# Patient Record
Sex: FEMALE | Race: WHITE | Employment: UNEMPLOYED | URBAN - METROPOLITAN AREA
[De-identification: names, ages, dates, MRNs, and addresses within clinical notes are randomized per-mention and may not be internally consistent; named-entity substitution may affect disease eponyms.]

---

## 2019-02-27 ENCOUNTER — APPOINTMENT (OUTPATIENT)
Dept: GENERAL RADIOLOGY | Age: 39
End: 2019-02-27
Attending: FAMILY MEDICINE
Payer: COMMERCIAL

## 2019-02-27 ENCOUNTER — HOSPITAL ENCOUNTER (OUTPATIENT)
Age: 39
Discharge: HOME | End: 2019-02-27
Attending: FAMILY MEDICINE
Payer: COMMERCIAL

## 2019-02-27 VITALS
DIASTOLIC BLOOD PRESSURE: 76 MMHG | OXYGEN SATURATION: 100 % | SYSTOLIC BLOOD PRESSURE: 123 MMHG | TEMPERATURE: 98 F | HEART RATE: 90 BPM | RESPIRATION RATE: 18 BRPM

## 2019-02-27 DIAGNOSIS — S92.901A CLOSED FRACTURE OF RIGHT FOOT, INITIAL ENCOUNTER: Primary | ICD-10-CM

## 2019-02-27 PROCEDURE — 99213 OFFICE O/P EST LOW 20 MIN: CPT

## 2019-02-27 PROCEDURE — 73610 X-RAY EXAM OF ANKLE: CPT | Performed by: FAMILY MEDICINE

## 2019-02-27 PROCEDURE — 73630 X-RAY EXAM OF FOOT: CPT | Performed by: FAMILY MEDICINE

## 2019-02-27 PROCEDURE — 28470 CLTX METATARSAL FX WO MNP EA: CPT

## 2019-02-27 NOTE — ED PROVIDER NOTES
[address] Seen [address]: [address]    History   Patient presents with:  Lower Extremity Injury (musculoskeletal)    Stated Complaint: INJURY TO RIGHT FOOT    HPI    Patient is here with the right foot pain after she rolled her motion. She exhibits edema (Mild swelling noted at the lateral malleolus and base of the right fifth metatarsal) and tenderness (Base of the right fifth metatarsal). She exhibits no deformity.    Neurological: She is alert and oriented to person, place, and podiatry    Disposition:  Discharge  2/27/2019  4:39 pm    Follow-up:  Mikala Duff DPM  [address]  [address] [facility] [address]  [phone]    Schedule an appointment as soon as possible for a visit

## 2019-10-30 ENCOUNTER — HOSPITAL ENCOUNTER (OUTPATIENT)
Dept: GENERAL RADIOLOGY | Age: 39
Discharge: HOME | End: 2019-10-30
Attending: FAMILY MEDICINE
Payer: COMMERCIAL

## 2019-10-30 DIAGNOSIS — R20.0 NUMBNESS: ICD-10-CM

## 2019-10-30 PROCEDURE — 73130 X-RAY EXAM OF HAND: CPT | Performed by: FAMILY MEDICINE

## 2021-04-13 NOTE — ED INITIAL ASSESSMENT (HPI)
Pt here with complaints of right ankle and foot pain, pt states she tripped on the side walk fell and twisted her ankle bad, both right ankle and foot are swollen and pts states she cannot put any wt on it 13-Apr-2021 08:55

## 2021-08-20 ENCOUNTER — HOSPITAL ENCOUNTER (OUTPATIENT)
Dept: MAMMOGRAPHY | Facility: HOSPITAL | Age: 41
Discharge: HOME | End: 2021-08-20
Attending: OBSTETRICS & GYNECOLOGY
Payer: COMMERCIAL

## 2021-08-20 ENCOUNTER — HOSPITAL ENCOUNTER (OUTPATIENT)
Dept: ULTRASOUND IMAGING | Facility: HOSPITAL | Age: 41
Discharge: HOME | End: 2021-08-20
Attending: OBSTETRICS & GYNECOLOGY
Payer: COMMERCIAL

## 2021-08-20 DIAGNOSIS — N60.02 SOLITARY CYST OF LEFT BREAST: ICD-10-CM

## 2021-08-20 PROCEDURE — 77066 DX MAMMO INCL CAD BI: CPT | Performed by: OBSTETRICS & GYNECOLOGY

## 2021-08-20 PROCEDURE — 77062 BREAST TOMOSYNTHESIS BI: CPT | Performed by: OBSTETRICS & GYNECOLOGY

## 2021-08-20 PROCEDURE — 76642 ULTRASOUND BREAST LIMITED: CPT | Performed by: OBSTETRICS & GYNECOLOGY

## 2021-11-06 ENCOUNTER — HOSPITAL ENCOUNTER (OUTPATIENT)
Age: 41
Discharge: HOME | End: 2021-11-06
Payer: COMMERCIAL

## 2021-11-06 VITALS
DIASTOLIC BLOOD PRESSURE: 97 MMHG | RESPIRATION RATE: 18 BRPM | HEART RATE: 90 BPM | TEMPERATURE: 98 F | OXYGEN SATURATION: 97 % | SYSTOLIC BLOOD PRESSURE: 125 MMHG

## 2021-11-06 DIAGNOSIS — M54.32 SCIATICA OF LEFT SIDE: Primary | ICD-10-CM

## 2021-11-06 PROCEDURE — 99213 OFFICE O/P EST LOW 20 MIN: CPT | Performed by: EMERGENCY MEDICINE

## 2021-11-06 RX ORDER — ESCITALOPRAM OXALATE 10 MG/1
10 TABLET ORAL DAILY
COMMUNITY

## 2021-11-06 RX ORDER — METHOCARBAMOL 500 MG/1
500 TABLET, FILM COATED ORAL 3 TIMES DAILY PRN
Qty: 14 TABLET | Refills: 0 | Status: SHIPPED | OUTPATIENT
Start: 2021-11-06 | End: 2021-12-14

## 2021-11-06 RX ORDER — VENLAFAXINE HYDROCHLORIDE 150 MG/1
CAPSULE, EXTENDED RELEASE ORAL
COMMUNITY
End: 2021-12-27

## 2021-11-06 RX ORDER — PREDNISONE 20 MG/1
60 TABLET ORAL ONCE
Status: COMPLETED | OUTPATIENT
Start: 2021-11-06 | End: 2021-11-06

## 2021-11-06 RX ORDER — PREDNISONE 20 MG/1
TABLET ORAL
Qty: 8 TABLET | Refills: 0 | Status: SHIPPED | OUTPATIENT
Start: 2021-11-07 | End: 2021-11-13

## 2021-11-06 RX ORDER — NORGESTIMATE AND ETHINYL ESTRADIOL 0.25-0.035
1 KIT ORAL DAILY
COMMUNITY

## 2021-11-06 NOTE — ED INITIAL ASSESSMENT (HPI)
Pt states on the 31st was lifting a table and felt something pull on left side of lower back into buttock. Pt states pain didn't really start until 1 to 2 days later pt states now unable to sit or get comfortable.  Pt states trying heat ice and massage naa

## 2021-11-06 NOTE — ED PROVIDER NOTES
Patient Seen in: [facility]      History   Patient presents with:  Back Pain    Stated Complaint: Pinched nerve    Subjective:   HPI  Ponce Macias is a 37year old female here for pinched nerve that started around [place].   Symptoms are pr Capillary Refill: Capillary refill takes less than 2 seconds. Findings: No erythema. Neurological:      General: No focal deficit present. Mental Status: She is alert and oriented to person, place, and time.    Psychiatric:         Attention a daily for 4 days. , Normal, Disp-8 tablet, R-0  NPI [id number]. Collaborating MD Betty Daniel. methocarbamol 500 MG Oral Tab  Take 1 tablet (500 mg total) by mouth 3 (three) times daily as needed., Normal, Disp-14 tablet, R-0  NPI [id number].  [employer]

## 2021-12-14 ENCOUNTER — OFFICE VISIT (OUTPATIENT)
Dept: ENDOCRINOLOGY CLINIC | Facility: CLINIC | Age: 41
End: 2021-12-14
Payer: COMMERCIAL

## 2021-12-14 VITALS — HEART RATE: 82 BPM | DIASTOLIC BLOOD PRESSURE: 86 MMHG | SYSTOLIC BLOOD PRESSURE: 143 MMHG

## 2021-12-14 DIAGNOSIS — R73.03 PRE-DIABETES: ICD-10-CM

## 2021-12-14 DIAGNOSIS — R19.7 DIARRHEA, UNSPECIFIED TYPE: Primary | ICD-10-CM

## 2021-12-14 PROCEDURE — 3077F SYST BP >= 140 MM HG: CPT | Performed by: INTERNAL MEDICINE

## 2021-12-14 PROCEDURE — 3079F DIAST BP 80-89 MM HG: CPT | Performed by: INTERNAL MEDICINE

## 2021-12-14 PROCEDURE — 99203 OFFICE O/P NEW LOW 30 MIN: CPT | Performed by: INTERNAL MEDICINE

## 2021-12-14 NOTE — H&P
New Patient Evaluation - History and Physical    CONSULT - Reason for Visit:  Pre diabetes   Requesting Physician: [facility]    CHIEF COMPLAINT:  Patient presents with:  Consult: Pt states has abnormal hormone imbalance issues since giv file.    ASSESSMENTS:     REVIEW OF SYSTEMS:  Constitutional: Negative for: weight change, fever, fatigue, cold/heat intolerance  Eyes: Negative for:  Visual changes, proptosis, blurring  ENT: Negative for:  dysphagia, neck swelling, dysphonia  Respiratory have anxiety, but states that she is doing well on lexapro and does not think that the diarrhea is related to anxiety  I reviewed that endocrine causes of diarrhea are very rare  Endocrine causes of chronic  diarrhea include Kopperl disease, carcinoid tumo

## 2021-12-27 ENCOUNTER — MED REC SCAN ONLY (OUTPATIENT)
Dept: FAMILY MEDICINE CLINIC | Facility: CLINIC | Age: 41
End: 2021-12-27

## 2021-12-27 ENCOUNTER — LAB ENCOUNTER (OUTPATIENT)
Dept: LAB | Age: 41
End: 2021-12-27
Attending: FAMILY MEDICINE
Payer: COMMERCIAL

## 2021-12-27 ENCOUNTER — OFFICE VISIT (OUTPATIENT)
Dept: FAMILY MEDICINE CLINIC | Facility: CLINIC | Age: 41
End: 2021-12-27
Payer: COMMERCIAL

## 2021-12-27 VITALS
WEIGHT: 158 LBS | OXYGEN SATURATION: 98 % | BODY MASS INDEX: 24.51 KG/M2 | HEART RATE: 83 BPM | DIASTOLIC BLOOD PRESSURE: 72 MMHG | SYSTOLIC BLOOD PRESSURE: 124 MMHG | HEIGHT: 67.5 IN

## 2021-12-27 DIAGNOSIS — J06.9 VIRAL URI: ICD-10-CM

## 2021-12-27 DIAGNOSIS — N94.6 DYSMENORRHEA: Primary | ICD-10-CM

## 2021-12-27 PROBLEM — F41.1 GENERALIZED ANXIETY DISORDER: Status: ACTIVE | Noted: 2020-08-16

## 2021-12-27 PROCEDURE — 99203 OFFICE O/P NEW LOW 30 MIN: CPT | Performed by: FAMILY MEDICINE

## 2021-12-27 PROCEDURE — 3078F DIAST BP <80 MM HG: CPT | Performed by: FAMILY MEDICINE

## 2021-12-27 PROCEDURE — 3074F SYST BP LT 130 MM HG: CPT | Performed by: FAMILY MEDICINE

## 2021-12-27 PROCEDURE — 3008F BODY MASS INDEX DOCD: CPT | Performed by: FAMILY MEDICINE

## 2021-12-27 RX ORDER — ASCORBIC ACID 500 MG
TABLET ORAL
COMMUNITY

## 2021-12-27 NOTE — PROGRESS NOTES
CC:  Patient presents with:  Establish Care: hormonal imbalance      HPI: 37year old female with a history of anxiety here to establish care. Had a physical and pap smear about 3 weeks ago with her gynecologist at [facility].    Had a hemoglobin A1C of Diagnosis Date   • Anxiety        Social History    Socioeconomic History      Marital status:       Spouse name: Not on file      Number of children: Not on file      Years of education: Not on file      Highest education level: Not on file    Oc and Plan: 37year old female here to establish care and discuss now chronic dysmenorrhea with concern for hormonal imbalance.      1. Dysmenorrhea    - Continuous OCP's help slightly but still having significant diarrhea and abdominal cramping on the last f

## 2021-12-31 ENCOUNTER — LAB ENCOUNTER (OUTPATIENT)
Dept: LAB | Age: 41
End: 2021-12-31
Attending: INTERNAL MEDICINE
Payer: COMMERCIAL

## 2021-12-31 DIAGNOSIS — R19.7 DIARRHEA, UNSPECIFIED TYPE: ICD-10-CM

## 2021-12-31 LAB
CORTIS SERPL-MCNC: 20.4 UG/DL
CREAT UR-SCNC: 0.71 G/24 HR (ref 0.6–1.8)
SPECIMEN VOL UR: 2000 ML

## 2021-12-31 PROCEDURE — 83497 ASSAY OF 5-HIAA: CPT

## 2021-12-31 PROCEDURE — 82308 ASSAY OF CALCITONIN: CPT

## 2021-12-31 PROCEDURE — 36415 COLL VENOUS BLD VENIPUNCTURE: CPT

## 2021-12-31 PROCEDURE — 82533 TOTAL CORTISOL: CPT

## 2021-12-31 PROCEDURE — 83835 ASSAY OF METANEPHRINES: CPT

## 2021-12-31 PROCEDURE — 82570 ASSAY OF URINE CREATININE: CPT

## 2021-12-31 PROCEDURE — 83516 IMMUNOASSAY NONANTIBODY: CPT

## 2022-01-04 LAB
CALCITONIN: <2 PG/ML
TTG IGG SER-ACNC: <0.6 U/ML (ref ?–7)

## 2022-01-05 LAB
5-HIAA URINE - PER 24H: 2 MG/D
5-HIAA URINE - PER VOLUME: 1 MG/L
5-HIAA URINE - RATIO TO CRT: 3 MG/GCR
CREATININE, URINE - PER 24H: 740 MG/D
CREATININE, URINE - PER VOLUME: 37 MG/DL
HOURS COLLECTED: 24 HR
METANEPHRINE: 0.11 NMOL/L
NORMETANEPHRINE: 0.39 NMOL/L
TOTAL VOLUME: 2000 ML

## 2022-02-25 ENCOUNTER — HOSPITAL ENCOUNTER (OUTPATIENT)
Dept: MAMMOGRAPHY | Facility: HOSPITAL | Age: 42
Discharge: HOME | End: 2022-02-25
Attending: OBSTETRICS & GYNECOLOGY
Payer: COMMERCIAL

## 2022-02-25 ENCOUNTER — HOSPITAL ENCOUNTER (OUTPATIENT)
Dept: ULTRASOUND IMAGING | Facility: HOSPITAL | Age: 42
Discharge: HOME | End: 2022-02-25
Attending: OBSTETRICS & GYNECOLOGY
Payer: COMMERCIAL

## 2022-02-25 DIAGNOSIS — N63.20 UNSPECIFIED LUMP IN THE LEFT BREAST, UNSPECIFIED QUADRANT: ICD-10-CM

## 2022-02-25 PROCEDURE — 77065 DX MAMMO INCL CAD UNI: CPT | Performed by: OBSTETRICS & GYNECOLOGY

## 2022-02-25 PROCEDURE — 76642 ULTRASOUND BREAST LIMITED: CPT | Performed by: OBSTETRICS & GYNECOLOGY

## 2022-02-25 PROCEDURE — 77061 BREAST TOMOSYNTHESIS UNI: CPT | Performed by: OBSTETRICS & GYNECOLOGY

## 2022-04-27 ENCOUNTER — TELEMEDICINE (OUTPATIENT)
Dept: INTEGRATIVE MEDICINE | Facility: CLINIC | Age: 42
End: 2022-04-27
Payer: COMMERCIAL

## 2022-04-27 DIAGNOSIS — N94.6 DYSMENORRHEA: ICD-10-CM

## 2022-04-27 DIAGNOSIS — R19.7 DIARRHEA, UNSPECIFIED TYPE: Primary | ICD-10-CM

## 2022-06-08 ENCOUNTER — LAB ENCOUNTER (OUTPATIENT)
Dept: LAB | Facility: REFERENCE LAB | Age: 42
End: 2022-06-08
Attending: FAMILY MEDICINE
Payer: COMMERCIAL

## 2022-06-08 DIAGNOSIS — R19.7 DIARRHEA, UNSPECIFIED TYPE: ICD-10-CM

## 2022-06-08 DIAGNOSIS — N94.6 DYSMENORRHEA: ICD-10-CM

## 2022-06-08 LAB
ALBUMIN SERPL-MCNC: 3.8 G/DL (ref 3.4–5)
ALBUMIN/GLOB SERPL: 1.1 {RATIO} (ref 1–2)
ALP LIVER SERPL-CCNC: 73 U/L
ALT SERPL-CCNC: 30 U/L
ANION GAP SERPL CALC-SCNC: 6 MMOL/L (ref 0–18)
AST SERPL-CCNC: 19 U/L (ref 15–37)
BASOPHILS # BLD AUTO: 0.07 X10(3) UL (ref 0–0.2)
BASOPHILS NFR BLD AUTO: 1 %
BILIRUB SERPL-MCNC: 0.2 MG/DL (ref 0.1–2)
BUN BLD-MCNC: 10 MG/DL (ref 7–18)
BUN/CREAT SERPL: 15.4 (ref 10–20)
CALCIUM BLD-MCNC: 9.5 MG/DL (ref 8.5–10.1)
CHLORIDE SERPL-SCNC: 108 MMOL/L (ref 98–112)
CO2 SERPL-SCNC: 25 MMOL/L (ref 21–32)
CREAT BLD-MCNC: 0.65 MG/DL
CRP SERPL HS-MCNC: 3.91 MG/L (ref ?–3)
DEPRECATED RDW RBC AUTO: 39.7 FL (ref 35.1–46.3)
DHEA-S SERPL-MCNC: 217.4 UG/DL
EOSINOPHIL # BLD AUTO: 0.19 X10(3) UL (ref 0–0.7)
EOSINOPHIL NFR BLD AUTO: 2.7 %
ERYTHROCYTE [DISTWIDTH] IN BLOOD BY AUTOMATED COUNT: 12.1 % (ref 11–15)
FASTING STATUS PATIENT QL REPORTED: NO
FSH SERPL-ACNC: 6.2 MIU/ML
GLOBULIN PLAS-MCNC: 3.4 G/DL (ref 2.8–4.4)
GLUCOSE BLD-MCNC: 104 MG/DL (ref 70–99)
HCT VFR BLD AUTO: 39.5 %
HGB BLD-MCNC: 12.8 G/DL
IMM GRANULOCYTES # BLD AUTO: 0.02 X10(3) UL (ref 0–1)
IMM GRANULOCYTES NFR BLD: 0.3 %
LYMPHOCYTES # BLD AUTO: 1.46 X10(3) UL (ref 1–4)
LYMPHOCYTES NFR BLD AUTO: 21.1 %
MCH RBC QN AUTO: 29 PG (ref 26–34)
MCHC RBC AUTO-ENTMCNC: 32.4 G/DL (ref 31–37)
MCV RBC AUTO: 89.6 FL
MONOCYTES # BLD AUTO: 0.52 X10(3) UL (ref 0.1–1)
MONOCYTES NFR BLD AUTO: 7.5 %
NEUTROPHILS # BLD AUTO: 4.66 X10 (3) UL (ref 1.5–7.7)
NEUTROPHILS # BLD AUTO: 4.66 X10(3) UL (ref 1.5–7.7)
NEUTROPHILS NFR BLD AUTO: 67.4 %
OSMOLALITY SERPL CALC.SUM OF ELEC: 287 MOSM/KG (ref 275–295)
PLATELET # BLD AUTO: 267 10(3)UL (ref 150–450)
POTASSIUM SERPL-SCNC: 3.9 MMOL/L (ref 3.5–5.1)
PROGEST SERPL-MCNC: 1.01 NG/ML
PROT SERPL-MCNC: 7.2 G/DL (ref 6.4–8.2)
RBC # BLD AUTO: 4.41 X10(6)UL
SODIUM SERPL-SCNC: 139 MMOL/L (ref 136–145)
WBC # BLD AUTO: 6.9 X10(3) UL (ref 4–11)

## 2022-06-08 PROCEDURE — 84144 ASSAY OF PROGESTERONE: CPT

## 2022-06-08 PROCEDURE — 82627 DEHYDROEPIANDROSTERONE: CPT

## 2022-06-08 PROCEDURE — 86141 C-REACTIVE PROTEIN HS: CPT

## 2022-06-08 PROCEDURE — 85025 COMPLETE CBC W/AUTO DIFF WBC: CPT

## 2022-06-08 PROCEDURE — 36415 COLL VENOUS BLD VENIPUNCTURE: CPT

## 2022-06-08 PROCEDURE — 80053 COMPREHEN METABOLIC PANEL: CPT

## 2022-06-08 PROCEDURE — 83001 ASSAY OF GONADOTROPIN (FSH): CPT

## 2022-06-08 PROCEDURE — 82671 ASSAY OF ESTROGENS: CPT

## 2022-06-09 ENCOUNTER — PATIENT MESSAGE (OUTPATIENT)
Dept: INTEGRATIVE MEDICINE | Facility: CLINIC | Age: 42
End: 2022-06-09

## 2022-06-13 NOTE — TELEPHONE ENCOUNTER
From: Brayden Loges  To: Uziel Fish DO  Sent: 6/9/2022 5:21 PM CDT  Subject: Lab results     Hi Dr. Luigi Patel,    I had one of my cyclical episodes and had lab work completed. I noticed my glucose level was slightly elevated and my hsCRP indicated high risk. Gulp. I exercise daily and eat a healthy diet. And I do not smoke. Thoughts? Concerns?      Thanks,  Wm

## 2022-06-22 LAB
ESTRADIOL BY TMS: 21.5 PG/ML
ESTROGENS TOTAL CALCULATION: 45.9 PG/ML
ESTRONE BY TMS: 24.4 PG/ML

## 2022-07-14 ENCOUNTER — TELEPHONE (OUTPATIENT)
Dept: INTEGRATIVE MEDICINE | Facility: CLINIC | Age: 42
End: 2022-07-14

## 2022-07-14 NOTE — TELEPHONE ENCOUNTER
Yanao Test testing received and placed in scan bin.      Copy placed in bin     Next Appointment:07/23/2022

## 2022-07-23 ENCOUNTER — TELEMEDICINE (OUTPATIENT)
Dept: INTEGRATIVE MEDICINE | Facility: CLINIC | Age: 42
End: 2022-07-23

## 2022-07-23 ENCOUNTER — PATIENT MESSAGE (OUTPATIENT)
Dept: OTOLARYNGOLOGY | Facility: CLINIC | Age: 42
End: 2022-07-23

## 2022-07-23 DIAGNOSIS — N94.6 DYSMENORRHEA: ICD-10-CM

## 2022-07-23 DIAGNOSIS — R19.7 DIARRHEA, UNSPECIFIED TYPE: Primary | ICD-10-CM

## 2022-07-23 PROCEDURE — 99214 OFFICE O/P EST MOD 30 MIN: CPT | Performed by: FAMILY MEDICINE

## 2022-07-23 RX ORDER — ESCITALOPRAM OXALATE 5 MG/1
TABLET ORAL
COMMUNITY
Start: 2022-06-26

## 2022-07-23 RX ORDER — DICYCLOMINE HYDROCHLORIDE 10 MG/1
CAPSULE ORAL
COMMUNITY
Start: 2022-07-06

## 2022-07-24 ENCOUNTER — PATIENT MESSAGE (OUTPATIENT)
Dept: FAMILY MEDICINE CLINIC | Facility: CLINIC | Age: 42
End: 2022-07-24

## 2022-07-24 NOTE — TELEPHONE ENCOUNTER
From: Leandro Carmona  To: Omega Russo DO  Sent: 7/24/2022 10:09 AM CDT  Subject: OBGYN recommendation     Hi Dr. Dino Brothers,    I received my [place] (hormone) test results, I have estrogen dominance and low progesterone which could be contributing to my cyclical symptoms. Per Dr. Lan Ludwig, I am going to start some supplements but I would also like an OBGYN referral-someone who specializes in endometriosis. Feel free to pass along any referrals in that area. Hope you are having a good summer.      Thanks,  Richard Thakkar

## 2022-08-22 ENCOUNTER — PATIENT MESSAGE (OUTPATIENT)
Dept: OBGYN CLINIC | Facility: CLINIC | Age: 42
End: 2022-08-22

## 2022-08-22 ENCOUNTER — TELEPHONE (OUTPATIENT)
Dept: OBGYN CLINIC | Facility: CLINIC | Age: 42
End: 2022-08-22

## 2022-08-22 ENCOUNTER — OFFICE VISIT (OUTPATIENT)
Dept: OBGYN CLINIC | Facility: CLINIC | Age: 42
End: 2022-08-22
Payer: COMMERCIAL

## 2022-08-22 VITALS
WEIGHT: 158 LBS | HEIGHT: 67.5 IN | SYSTOLIC BLOOD PRESSURE: 114 MMHG | BODY MASS INDEX: 24.51 KG/M2 | DIASTOLIC BLOOD PRESSURE: 60 MMHG

## 2022-08-22 DIAGNOSIS — Z12.31 BREAST CANCER SCREENING BY MAMMOGRAM: Primary | ICD-10-CM

## 2022-08-22 DIAGNOSIS — N80.9 ENDOMETRIOSIS: Primary | ICD-10-CM

## 2022-08-22 DIAGNOSIS — Z01.818 PREOP TESTING: ICD-10-CM

## 2022-08-22 PROCEDURE — 99204 OFFICE O/P NEW MOD 45 MIN: CPT | Performed by: OBSTETRICS & GYNECOLOGY

## 2022-08-22 PROCEDURE — 3074F SYST BP LT 130 MM HG: CPT | Performed by: OBSTETRICS & GYNECOLOGY

## 2022-08-22 PROCEDURE — 3078F DIAST BP <80 MM HG: CPT | Performed by: OBSTETRICS & GYNECOLOGY

## 2022-08-22 PROCEDURE — 3008F BODY MASS INDEX DOCD: CPT | Performed by: OBSTETRICS & GYNECOLOGY

## 2022-08-22 RX ORDER — POLYETHYLENE GLYCOL 1450
POWDER (GRAM) MISCELLANEOUS
Qty: 238 G | Refills: 0 | Status: SHIPPED | OUTPATIENT
Start: 2022-08-22

## 2022-08-22 RX ORDER — ESCITALOPRAM OXALATE 20 MG/1
TABLET ORAL
COMMUNITY
Start: 2022-08-02

## 2022-08-22 NOTE — TELEPHONE ENCOUNTER
----- Message from Randal June DO sent at 8/22/2022 11:07 AM CDT -----  Regarding: please sched laparoscopic endometriosis removal  Please schedule the following surgery:    Procedure: laparoscopy with endometriosis removal CPT 57343  Assist: Freda Zarco CSA  Date:  9/8/22                               Time Requested: 0730 or any avaiable time before 3pm  Dx: endometriosis  Pre-op appt: na  Admission type: outpatient  Department of discharge(SDS/Floor): [facility]  Expected length of stay: na  Procedure length time (please enter amount you are requesting): 1 hr  Recovery time (patients always ask): 1-2 weeks  Medical Clearance: (Y/N) no  Post- Op f/u appt time frame: 2 weeks    Thank  you!   ~RD

## 2022-08-23 ENCOUNTER — PATIENT MESSAGE (OUTPATIENT)
Dept: FAMILY MEDICINE CLINIC | Facility: CLINIC | Age: 42
End: 2022-08-23

## 2022-08-23 NOTE — TELEPHONE ENCOUNTER
From: Jaky Mascorro  To: Jaxson Lowery DO  Sent: 8/23/2022 8:07 AM CDT  Subject: Next steps     Hi Dr. Katya Segovia,    Just wanted to keep you in the loop. I met Dr. Cee Blank yesterday and I am scheduled for laparoscopy on 9/8 to explore if I have endometriosis. Thanks again for the referral.    Carlos Sacramento your son has a great school year!     Gianfranco Rushing

## 2022-09-05 ENCOUNTER — LAB ENCOUNTER (OUTPATIENT)
Dept: LAB | Facility: HOSPITAL | Age: 42
End: 2022-09-05
Attending: OBSTETRICS & GYNECOLOGY
Payer: COMMERCIAL

## 2022-09-05 DIAGNOSIS — Z01.818 PREOP TESTING: ICD-10-CM

## 2022-09-06 LAB — SARS-COV-2 RNA RESP QL NAA+PROBE: NOT DETECTED

## 2022-09-08 ENCOUNTER — ANESTHESIA (OUTPATIENT)
Dept: SURGERY | Facility: HOSPITAL | Age: 42
End: 2022-09-08
Payer: COMMERCIAL

## 2022-09-08 ENCOUNTER — ANESTHESIA EVENT (OUTPATIENT)
Dept: SURGERY | Facility: HOSPITAL | Age: 42
End: 2022-09-08
Payer: COMMERCIAL

## 2022-09-08 ENCOUNTER — HOSPITAL ENCOUNTER (OUTPATIENT)
Facility: HOSPITAL | Age: 42
Setting detail: HOSPITAL OUTPATIENT SURGERY
Discharge: HOME | End: 2022-09-08
Attending: OBSTETRICS & GYNECOLOGY | Admitting: OBSTETRICS & GYNECOLOGY
Payer: COMMERCIAL

## 2022-09-08 VITALS
WEIGHT: 160 LBS | OXYGEN SATURATION: 100 % | TEMPERATURE: 97 F | SYSTOLIC BLOOD PRESSURE: 106 MMHG | HEART RATE: 63 BPM | DIASTOLIC BLOOD PRESSURE: 49 MMHG | HEIGHT: 67.5 IN | BODY MASS INDEX: 24.82 KG/M2 | RESPIRATION RATE: 16 BRPM

## 2022-09-08 DIAGNOSIS — N80.9 ENDOMETRIOSIS: ICD-10-CM

## 2022-09-08 LAB
ANTIBODY SCREEN: NEGATIVE
B-HCG UR QL: NEGATIVE
RH BLOOD TYPE: POSITIVE
RH BLOOD TYPE: POSITIVE

## 2022-09-08 PROCEDURE — 99244 OFF/OP CNSLTJ NEW/EST MOD 40: CPT | Performed by: SURGERY

## 2022-09-08 PROCEDURE — 0DNN4ZZ RELEASE SIGMOID COLON, PERCUTANEOUS ENDOSCOPIC APPROACH: ICD-10-PCS | Performed by: SURGERY

## 2022-09-08 PROCEDURE — 0DNU4ZZ RELEASE OMENTUM, PERCUTANEOUS ENDOSCOPIC APPROACH: ICD-10-PCS | Performed by: OBSTETRICS & GYNECOLOGY

## 2022-09-08 PROCEDURE — 58660 LAPAROSCOPY LYSIS: CPT | Performed by: OBSTETRICS & GYNECOLOGY

## 2022-09-08 PROCEDURE — 0DNW4ZZ RELEASE PERITONEUM, PERCUTANEOUS ENDOSCOPIC APPROACH: ICD-10-PCS | Performed by: SURGERY

## 2022-09-08 PROCEDURE — 0UN64ZZ RELEASE LEFT FALLOPIAN TUBE, PERCUTANEOUS ENDOSCOPIC APPROACH: ICD-10-PCS | Performed by: OBSTETRICS & GYNECOLOGY

## 2022-09-08 PROCEDURE — 44180 LAP ENTEROLYSIS: CPT | Performed by: SURGERY

## 2022-09-08 RX ORDER — MIDAZOLAM HYDROCHLORIDE 1 MG/ML
INJECTION INTRAMUSCULAR; INTRAVENOUS AS NEEDED
Status: DISCONTINUED | OUTPATIENT
Start: 2022-09-08 | End: 2022-09-08 | Stop reason: SURG

## 2022-09-08 RX ORDER — HYDROMORPHONE HYDROCHLORIDE 1 MG/ML
0.4 INJECTION, SOLUTION INTRAMUSCULAR; INTRAVENOUS; SUBCUTANEOUS EVERY 5 MIN PRN
Status: DISCONTINUED | OUTPATIENT
Start: 2022-09-08 | End: 2022-09-08

## 2022-09-08 RX ORDER — MORPHINE SULFATE 4 MG/ML
4 INJECTION, SOLUTION INTRAMUSCULAR; INTRAVENOUS EVERY 10 MIN PRN
Status: DISCONTINUED | OUTPATIENT
Start: 2022-09-08 | End: 2022-09-08

## 2022-09-08 RX ORDER — IBUPROFEN 600 MG/1
600 TABLET ORAL EVERY 6 HOURS PRN
Qty: 60 TABLET | Refills: 0 | Status: SHIPPED | OUTPATIENT
Start: 2022-09-08

## 2022-09-08 RX ORDER — ACETAMINOPHEN 500 MG
1000 TABLET ORAL ONCE
Status: COMPLETED | OUTPATIENT
Start: 2022-09-08 | End: 2022-09-08

## 2022-09-08 RX ORDER — KETOROLAC TROMETHAMINE 30 MG/ML
INJECTION, SOLUTION INTRAMUSCULAR; INTRAVENOUS AS NEEDED
Status: DISCONTINUED | OUTPATIENT
Start: 2022-09-08 | End: 2022-09-08 | Stop reason: SURG

## 2022-09-08 RX ORDER — MORPHINE SULFATE 4 MG/ML
2 INJECTION, SOLUTION INTRAMUSCULAR; INTRAVENOUS EVERY 10 MIN PRN
Status: DISCONTINUED | OUTPATIENT
Start: 2022-09-08 | End: 2022-09-08

## 2022-09-08 RX ORDER — ONDANSETRON 2 MG/ML
INJECTION INTRAMUSCULAR; INTRAVENOUS AS NEEDED
Status: DISCONTINUED | OUTPATIENT
Start: 2022-09-08 | End: 2022-09-08 | Stop reason: SURG

## 2022-09-08 RX ORDER — ONDANSETRON 2 MG/ML
4 INJECTION INTRAMUSCULAR; INTRAVENOUS EVERY 6 HOURS PRN
Status: DISCONTINUED | OUTPATIENT
Start: 2022-09-08 | End: 2022-09-08

## 2022-09-08 RX ORDER — SODIUM CHLORIDE, SODIUM LACTATE, POTASSIUM CHLORIDE, CALCIUM CHLORIDE 600; 310; 30; 20 MG/100ML; MG/100ML; MG/100ML; MG/100ML
INJECTION, SOLUTION INTRAVENOUS CONTINUOUS
Status: DISCONTINUED | OUTPATIENT
Start: 2022-09-08 | End: 2022-09-08

## 2022-09-08 RX ORDER — BUPIVACAINE HYDROCHLORIDE 2.5 MG/ML
INJECTION, SOLUTION EPIDURAL; INFILTRATION; INTRACAUDAL AS NEEDED
Status: DISCONTINUED | OUTPATIENT
Start: 2022-09-08 | End: 2022-09-08 | Stop reason: HOSPADM

## 2022-09-08 RX ORDER — KETAMINE HYDROCHLORIDE 50 MG/ML
INJECTION, SOLUTION, CONCENTRATE INTRAMUSCULAR; INTRAVENOUS AS NEEDED
Status: DISCONTINUED | OUTPATIENT
Start: 2022-09-08 | End: 2022-09-08 | Stop reason: SURG

## 2022-09-08 RX ORDER — GABAPENTIN 300 MG/1
300 CAPSULE ORAL 3 TIMES DAILY
Qty: 10 CAPSULE | Refills: 0 | Status: SHIPPED | OUTPATIENT
Start: 2022-09-08 | End: 2022-09-11

## 2022-09-08 RX ORDER — LIDOCAINE HYDROCHLORIDE 10 MG/ML
INJECTION, SOLUTION EPIDURAL; INFILTRATION; INTRACAUDAL; PERINEURAL AS NEEDED
Status: DISCONTINUED | OUTPATIENT
Start: 2022-09-08 | End: 2022-09-08 | Stop reason: SURG

## 2022-09-08 RX ORDER — ROCURONIUM BROMIDE 10 MG/ML
INJECTION, SOLUTION INTRAVENOUS AS NEEDED
Status: DISCONTINUED | OUTPATIENT
Start: 2022-09-08 | End: 2022-09-08 | Stop reason: SURG

## 2022-09-08 RX ORDER — ONDANSETRON 2 MG/ML
4 INJECTION INTRAMUSCULAR; INTRAVENOUS EVERY 8 HOURS PRN
Status: CANCELLED | OUTPATIENT
Start: 2022-09-08

## 2022-09-08 RX ORDER — NEOSTIGMINE METHYLSULFATE 1 MG/ML
INJECTION, SOLUTION INTRAVENOUS AS NEEDED
Status: DISCONTINUED | OUTPATIENT
Start: 2022-09-08 | End: 2022-09-08 | Stop reason: SURG

## 2022-09-08 RX ORDER — HYDROMORPHONE HYDROCHLORIDE 1 MG/ML
0.6 INJECTION, SOLUTION INTRAMUSCULAR; INTRAVENOUS; SUBCUTANEOUS EVERY 5 MIN PRN
Status: DISCONTINUED | OUTPATIENT
Start: 2022-09-08 | End: 2022-09-08

## 2022-09-08 RX ORDER — PROCHLORPERAZINE EDISYLATE 5 MG/ML
5 INJECTION INTRAMUSCULAR; INTRAVENOUS EVERY 8 HOURS PRN
Status: DISCONTINUED | OUTPATIENT
Start: 2022-09-08 | End: 2022-09-08

## 2022-09-08 RX ORDER — MORPHINE SULFATE 10 MG/ML
6 INJECTION, SOLUTION INTRAMUSCULAR; INTRAVENOUS EVERY 10 MIN PRN
Status: DISCONTINUED | OUTPATIENT
Start: 2022-09-08 | End: 2022-09-08

## 2022-09-08 RX ORDER — IBUPROFEN 600 MG/1
600 TABLET ORAL EVERY 6 HOURS
Status: DISCONTINUED | OUTPATIENT
Start: 2022-09-08 | End: 2022-09-08

## 2022-09-08 RX ORDER — NALOXONE HYDROCHLORIDE 0.4 MG/ML
80 INJECTION, SOLUTION INTRAMUSCULAR; INTRAVENOUS; SUBCUTANEOUS AS NEEDED
Status: DISCONTINUED | OUTPATIENT
Start: 2022-09-08 | End: 2022-09-08

## 2022-09-08 RX ORDER — DEXAMETHASONE SODIUM PHOSPHATE 4 MG/ML
VIAL (ML) INJECTION AS NEEDED
Status: DISCONTINUED | OUTPATIENT
Start: 2022-09-08 | End: 2022-09-08 | Stop reason: SURG

## 2022-09-08 RX ORDER — ONDANSETRON 4 MG/1
4 TABLET, FILM COATED ORAL EVERY 8 HOURS PRN
Status: CANCELLED | OUTPATIENT
Start: 2022-09-08

## 2022-09-08 RX ORDER — ACETAMINOPHEN 325 MG/1
325 TABLET ORAL EVERY 6 HOURS PRN
Qty: 60 TABLET | Refills: 0 | Status: SHIPPED | OUTPATIENT
Start: 2022-09-08

## 2022-09-08 RX ORDER — HYDROMORPHONE HYDROCHLORIDE 1 MG/ML
0.2 INJECTION, SOLUTION INTRAMUSCULAR; INTRAVENOUS; SUBCUTANEOUS EVERY 5 MIN PRN
Status: DISCONTINUED | OUTPATIENT
Start: 2022-09-08 | End: 2022-09-08

## 2022-09-08 RX ORDER — GLYCOPYRROLATE 0.2 MG/ML
INJECTION, SOLUTION INTRAMUSCULAR; INTRAVENOUS AS NEEDED
Status: DISCONTINUED | OUTPATIENT
Start: 2022-09-08 | End: 2022-09-08 | Stop reason: SURG

## 2022-09-08 RX ADMIN — DEXAMETHASONE SODIUM PHOSPHATE 8 MG: 4 MG/ML VIAL (ML) INJECTION at 11:07:00

## 2022-09-08 RX ADMIN — KETAMINE HYDROCHLORIDE 25 MG: 50 INJECTION, SOLUTION, CONCENTRATE INTRAMUSCULAR; INTRAVENOUS at 11:17:00

## 2022-09-08 RX ADMIN — LIDOCAINE HYDROCHLORIDE 40 MG: 10 INJECTION, SOLUTION EPIDURAL; INFILTRATION; INTRACAUDAL; PERINEURAL at 11:00:00

## 2022-09-08 RX ADMIN — NEOSTIGMINE METHYLSULFATE 3.5 MG: 1 INJECTION, SOLUTION INTRAVENOUS at 11:57:00

## 2022-09-08 RX ADMIN — ROCURONIUM BROMIDE 30 MG: 10 INJECTION, SOLUTION INTRAVENOUS at 11:01:00

## 2022-09-08 RX ADMIN — GLYCOPYRROLATE 0.5 MG: 0.2 INJECTION, SOLUTION INTRAMUSCULAR; INTRAVENOUS at 11:57:00

## 2022-09-08 RX ADMIN — SODIUM CHLORIDE, SODIUM LACTATE, POTASSIUM CHLORIDE, CALCIUM CHLORIDE: 600; 310; 30; 20 INJECTION, SOLUTION INTRAVENOUS at 10:55:00

## 2022-09-08 RX ADMIN — ONDANSETRON 4 MG: 2 INJECTION INTRAMUSCULAR; INTRAVENOUS at 11:52:00

## 2022-09-08 RX ADMIN — KETOROLAC TROMETHAMINE 15 MG: 30 INJECTION, SOLUTION INTRAMUSCULAR; INTRAVENOUS at 11:57:00

## 2022-09-08 RX ADMIN — LIDOCAINE HYDROCHLORIDE 60 MG: 10 INJECTION, SOLUTION EPIDURAL; INFILTRATION; INTRACAUDAL; PERINEURAL at 11:01:00

## 2022-09-08 RX ADMIN — SODIUM CHLORIDE, SODIUM LACTATE, POTASSIUM CHLORIDE, CALCIUM CHLORIDE: 600; 310; 30; 20 INJECTION, SOLUTION INTRAVENOUS at 11:12:00

## 2022-09-08 RX ADMIN — MIDAZOLAM HYDROCHLORIDE 2 MG: 1 INJECTION INTRAMUSCULAR; INTRAVENOUS at 10:55:00

## 2022-09-08 NOTE — ANESTHESIA PROCEDURE NOTES
Airway  Date/Time: 9/8/2022 11:02 AM  Urgency: Elective      General Information and Staff    Patient location during procedure: OR  Resident/CRNA: Cassy Silverio CRNA  Performed: CRNA     Indications and Patient Condition  Indications for airway management: anesthesia  Sedation level: deep  Preoxygenated: yes  Patient position: sniffing  Mask difficulty assessment: 1 - vent by mask    Final Airway Details  Final airway type: endotracheal airway      Successful airway: ETT  Cuffed: yes   Successful intubation technique: direct laryngoscopy  Facilitating devices/methods: intubating stylet  Endotracheal tube insertion site: oral  Blade: Anibal  Blade size: #3  ETT size (mm): 6.5    Cormack-Lehane Classification: grade I - full view of glottis  Placement verified by: chest auscultation and capnometry   Cuff volume (mL): 6  Measured from: teeth  ETT to teeth (cm): 22  Number of attempts at approach: 1

## 2022-09-08 NOTE — OPERATIVE REPORT
OPERATIVE REPORT:     Patient: Joe Wilkins  MRN: [id number]  Date: 9/8/22    Pre op diagnosis:  Pelvic pain, endometriosis    Post op diagnosis:   Same as above with pelvic adhesions    Procedure:    Laparoscopic lysis of adhesions    Surgeon:  Dr Albania Nevarez DO     Consulting surgeon: Grant Madison MD    Assisting Surgeon: Nadya Panda CSA      Findings: Normal external genitalia, no lesions. Normal cervix, no lesions. Uterus adhered to anterior abdominal wall and bladder. Omental adhesion to anterior abdominal wall. Mesentery adhesion to posterior cul de sac and to left fallopian tube. Inflamed, tortuous vasculature throughout pelvis. Anesthesia: General     EBL: 10 mL    Fluids: 600 mL    Urine output: 100 mL    Procedure:   After informed consent was obtained, the patient was taken to the operating room and general anesthesia was initiated. She was placed in dorsal lithotomy position with arms tucked. She was prepped and draped in normal sterile fashion. A sterile speculum was placed in the vagina. The cervix was visualized. A single tooth tenaculum was used to grasp the anterior lip of the cervix. The [place] uterine manipulator was secured in place with the single tooth tenaculum. The pimentel urinary catheter was then properly secured. Attention was then turned towards the abdomen. After infiltration with 0.25% marcaine, a horizontal 5 mm incision was made within the superior portion of umbilicus. A Veress needle was then placed through the incision and advanced until two pop-like sensation were noted. A sterile syringed filled with normal saline was used to confirm placement of there Veress needle in the abdominal cavity. Once placement was confirmed, the gas tubing was connected and Co2 gas insufflation was initiated with opening pressure of 4 mmHg. The abdomen was insufflated to pressure of 15 mmHg.  The 5 mm laparoscopic camera was then placed into the 5 mm trocar and the abdomen was then entered under direct visualization. A second horizontal 5 mm incision was then made in the right lower quadrant and a 5 mm trocar was advanced under laparoscopic visualization. The enseal device was used to take down the omental adhesion. A third 5 mm trocar was placed in the left lower quadrant in similar fashion. The patient was placed in trendelenburg position. The fallopian tube adhesion was removed using the enseal device. Dr. Chava Borges was called to assist in bowel inspection and he removed the posterior cul de sac adhesion. No endometriosis deposits were visible for biopsy. All adhesion sited were reinspected and noted to be hemostatic. The pneumoperitoneum was released and the trocars were all removed. The incisions were closed with 4-0 monocryl and skin glue was placed. Attention was then directed towards the vagina. A sterile speculum was placed in the vagina. The cervix was visualized. The [place] uterine manipulator was removed from the cervical canal. The single tooth tenaculum was then removed. Good hemostasis was noted. All instruments were removed from the vagina. The pimentel urinary catheter was removed. All sponge, lap and instrument counts were correct x 2. Patient tolerated the procedure well and was taken to recovery room in stable condition.      Disposition: stable     Complications: None       Bisi Wong DO    [address] [place] [address] OBN

## 2022-09-08 NOTE — CONSULTS
[facility]    PATIENT'S NAME: Jordan Costa   ATTENDING PHYSICIAN: Kole Rea DO   CONSULTING PHYSICIAN: Keily Solano MD   PATIENT ACCOUNT#:   [de-identified]    LOCATION:  [facility]  MEDICAL RECORD #:   [id number]       YOB: 1980  ADMISSION DATE:       2022      CONSULT DATE:  2022    REPORT OF CONSULTATION    #####EDITING MAY BE REQUIRED#####    REASON FOR CONSULTATION:  Possible adhesiolysis, possible bowel resection, endometriosis. HISTORY OF PRESENT ILLNESS:  The patient is a very pleasant 56-year-old female who is interviewed with her  present. She is presenting for diagnostic laparoscopy and endometrial cyst removal by Dr. Kulwant Hurley. I am asked to possibly assist if there is bowel involvement. The patient has been having pelvic pain and workup was done by her gynecologist.    Derl Boy HISTORY:  Anxiety and depression, PUPP. PAST SURGICAL HISTORY:  , left cyst aspiration. The patient is G2, P2.    MEDICATIONS:  None. ALLERGIES:  None. SOCIAL HISTORY:  She does not drink, smoke, or take illicit drugs. Minimal caffeine intake. FAMILY HISTORY:  Noncontributory. PHYSICAL EXAMINATION:    GENERAL:  She is pleasant, appears comfortable and in no distress, alert and oriented x3. VITAL SIGNS:  Stable. NECK:  Supple without lymphadenopathy. Trachea midline. LUNGS:  Clear to auscultation. HEART:  Regular rate and rhythm. ABDOMEN:  Soft, nontender, nondistended. EXTREMITIES:  Without clubbing, cyanosis, or edema. LABORATORY DATA:  Her _______ is normal.  Hemoglobin 12.8. IMPRESSION:  Endometriosis. Discussed plan with the patient. She understands and agrees. I thank you for this consultation.     Dictated By Keily Solano MD  d: 2022 10:17:53  t: 2022 10:36:28  Job [id number]  GL/    cc: Kole Rea DO

## 2022-09-08 NOTE — PROGRESS NOTES
General surgery consult    Consult dictated    Endometriosis, possible lysis of adhesions possible bowel resection.   Explained to patient and her  who understand and agree

## 2022-09-09 NOTE — OPERATIVE REPORT
[facility]    PATIENT'S NAME: Neil William   ATTENDING PHYSICIAN: Blanca Aiken DO   OPERATING PHYSICIAN: Macrina Potter MD   PATIENT ACCOUNT#:   [de-identified]    LOCATION:  [facility] 10  MEDICAL RECORD #:   [id number]       YOB: 1980  ADMISSION DATE:       09/08/2022      OPERATION DATE:  09/08/2022    OPERATIVE REPORT      PREOPERATIVE DIAGNOSIS:  Intraoperative consultation for adhesiolysis. POSTOPERATIVE DIAGNOSIS:  Intraoperative consultation for adhesiolysis. PROCEDURE:  Laparoscopic adhesiolysis of colonic adhesions. INDICATIONS:  Patient is 37, is taken to surgery for evaluation for endometriosis. Please refer to Dr. Juan Pulido operative report. OPERATIVE TECHNIQUE:  Trocars were all placed. I came to assist with adhesiolysis. There was a dense band deep in the cul-de-sac of the pericolonic fat, which was lysed using EnSeal.  Additional adhesions of the sigmoid colon to the adnexa is performed as well. I completed the surgery. There was no bleeding identified and Dr. Declan Ureña took over. Please refer to her note.     Dictated By Macrina Potter MD  d: 09/08/2022 13:59:07  t: 09/08/2022 20:36:25  Job [id number]  /    cc: Blanca Aiken DO

## 2022-09-22 ENCOUNTER — OFFICE VISIT (OUTPATIENT)
Dept: OBGYN CLINIC | Facility: CLINIC | Age: 42
End: 2022-09-22

## 2022-09-22 VITALS
SYSTOLIC BLOOD PRESSURE: 118 MMHG | BODY MASS INDEX: 24.51 KG/M2 | WEIGHT: 158 LBS | HEIGHT: 67.5 IN | DIASTOLIC BLOOD PRESSURE: 64 MMHG

## 2022-09-22 DIAGNOSIS — Z09 POSTOPERATIVE EXAMINATION: Primary | ICD-10-CM

## 2022-09-22 DIAGNOSIS — N80.9 ENDOMETRIOSIS: ICD-10-CM

## 2022-09-22 PROCEDURE — 3078F DIAST BP <80 MM HG: CPT | Performed by: OBSTETRICS & GYNECOLOGY

## 2022-09-22 PROCEDURE — 99024 POSTOP FOLLOW-UP VISIT: CPT | Performed by: OBSTETRICS & GYNECOLOGY

## 2022-09-22 PROCEDURE — 3074F SYST BP LT 130 MM HG: CPT | Performed by: OBSTETRICS & GYNECOLOGY

## 2022-09-22 PROCEDURE — 3008F BODY MASS INDEX DOCD: CPT | Performed by: OBSTETRICS & GYNECOLOGY

## 2022-09-23 ENCOUNTER — PATIENT MESSAGE (OUTPATIENT)
Dept: FAMILY MEDICINE CLINIC | Facility: CLINIC | Age: 42
End: 2022-09-23

## 2022-09-23 DIAGNOSIS — R63.5 WEIGHT GAIN: Primary | ICD-10-CM

## 2022-09-23 DIAGNOSIS — Z00.00 ROUTINE GENERAL MEDICAL EXAMINATION AT A HEALTH CARE FACILITY: ICD-10-CM

## 2022-09-24 NOTE — TELEPHONE ENCOUNTER
From: Andrea Couch  To: Hannah Marie, DO  Sent: 9/23/2022 3:07 PM CDT  Subject: Request for lab order     Hi Dr. Lea Gallegos,    I have had a weight gain of 8-10 pounds since mid summer. I eat healthy and walk everyday. Can you please put in a lab order to check my A1C and T3 and T4 levels. My physical is scheduled for 11/1 but I want to get blood work done now.      Thanks,  Hayden Powell

## 2022-09-26 ENCOUNTER — PATIENT MESSAGE (OUTPATIENT)
Dept: FAMILY MEDICINE CLINIC | Facility: CLINIC | Age: 42
End: 2022-09-26

## 2022-09-26 ENCOUNTER — HOSPITAL ENCOUNTER (OUTPATIENT)
Dept: MAMMOGRAPHY | Facility: HOSPITAL | Age: 42
Discharge: HOME | End: 2022-09-26
Attending: OBSTETRICS & GYNECOLOGY

## 2022-09-26 DIAGNOSIS — R92.8 ABNORMAL MAMMOGRAM OF BOTH BREASTS: Primary | ICD-10-CM

## 2022-09-26 DIAGNOSIS — R92.2 INCONCLUSIVE MAMMOGRAM: ICD-10-CM

## 2022-09-26 DIAGNOSIS — R92.8 ABNORMAL MAMMOGRAM OF LEFT BREAST: Primary | ICD-10-CM

## 2022-09-26 DIAGNOSIS — Z12.31 BREAST CANCER SCREENING BY MAMMOGRAM: ICD-10-CM

## 2022-09-26 DIAGNOSIS — R92.8 ABNORMAL MAMMOGRAM OF BOTH BREASTS: ICD-10-CM

## 2022-09-26 PROCEDURE — 77062 BREAST TOMOSYNTHESIS BI: CPT | Performed by: OBSTETRICS & GYNECOLOGY

## 2022-09-26 PROCEDURE — 77066 DX MAMMO INCL CAD BI: CPT | Performed by: OBSTETRICS & GYNECOLOGY

## 2022-09-26 NOTE — TELEPHONE ENCOUNTER
From: Leandro Carmona  To: Omega Russo DO  Sent: 9/23/2022 3:07 PM CDT  Subject: Request for lab order     Hi Dr. Dino Brothers,    I have had a weight gain of 8-10 pounds since mid summer. I eat healthy and walk everyday. Can you please put in a lab order to check my A1C and T3 and T4 levels. My physical is scheduled for 11/1 but I want to get blood work done now.      Thanks,  Richard Thakkar

## 2022-10-13 ENCOUNTER — PATIENT MESSAGE (OUTPATIENT)
Dept: OBGYN CLINIC | Facility: CLINIC | Age: 42
End: 2022-10-13

## 2022-10-13 NOTE — TELEPHONE ENCOUNTER
From: Glenn Lance  To: Rosalia Oakes DO  Sent: 10/13/2022 9:49 AM CDT  Subject: Bloating     Hi Dr. Ericka Patterson,    My recovery from surgery went really well. However, I constantly feel bloated (even when I first wake up in the morning). Anything I can do to ease the bloating? I walk daily. And I am not ready to cut-out caffeine.  I need my coffee! :)    Thanks,  Law Turner

## 2022-10-21 ENCOUNTER — PATIENT MESSAGE (OUTPATIENT)
Dept: INTEGRATIVE MEDICINE | Facility: CLINIC | Age: 42
End: 2022-10-21

## 2022-11-01 ENCOUNTER — OFFICE VISIT (OUTPATIENT)
Dept: FAMILY MEDICINE CLINIC | Facility: CLINIC | Age: 42
End: 2022-11-01
Payer: COMMERCIAL

## 2022-11-01 ENCOUNTER — LAB ENCOUNTER (OUTPATIENT)
Dept: LAB | Age: 42
End: 2022-11-01
Attending: FAMILY MEDICINE
Payer: COMMERCIAL

## 2022-11-01 VITALS
HEART RATE: 76 BPM | OXYGEN SATURATION: 98 % | WEIGHT: 158 LBS | HEIGHT: 67.5 IN | SYSTOLIC BLOOD PRESSURE: 122 MMHG | DIASTOLIC BLOOD PRESSURE: 76 MMHG | BODY MASS INDEX: 24.51 KG/M2

## 2022-11-01 DIAGNOSIS — Z00.00 ENCOUNTER FOR ROUTINE ADULT HEALTH EXAMINATION WITHOUT ABNORMAL FINDINGS: Primary | ICD-10-CM

## 2022-11-01 DIAGNOSIS — Z00.00 ROUTINE GENERAL MEDICAL EXAMINATION AT A HEALTH CARE FACILITY: ICD-10-CM

## 2022-11-01 DIAGNOSIS — R63.5 WEIGHT GAIN: ICD-10-CM

## 2022-11-01 DIAGNOSIS — Z00.00 ENCOUNTER FOR ROUTINE ADULT HEALTH EXAMINATION WITHOUT ABNORMAL FINDINGS: ICD-10-CM

## 2022-11-01 DIAGNOSIS — F41.1 GENERALIZED ANXIETY DISORDER: ICD-10-CM

## 2022-11-01 LAB
ALBUMIN SERPL-MCNC: 3.8 G/DL (ref 3.4–5)
ALBUMIN/GLOB SERPL: 1.1 {RATIO} (ref 1–2)
ALP LIVER SERPL-CCNC: 89 U/L
ALT SERPL-CCNC: 27 U/L
ANION GAP SERPL CALC-SCNC: 7 MMOL/L (ref 0–18)
AST SERPL-CCNC: 15 U/L (ref 15–37)
BASOPHILS # BLD AUTO: 0.08 X10(3) UL (ref 0–0.2)
BASOPHILS NFR BLD AUTO: 1.1 %
BILIRUB SERPL-MCNC: 0.2 MG/DL (ref 0.1–2)
BUN BLD-MCNC: 13 MG/DL (ref 7–18)
BUN/CREAT SERPL: 18.8 (ref 10–20)
CALCIUM BLD-MCNC: 8.8 MG/DL (ref 8.5–10.1)
CHLORIDE SERPL-SCNC: 107 MMOL/L (ref 98–112)
CHOLEST SERPL-MCNC: 200 MG/DL (ref ?–200)
CO2 SERPL-SCNC: 26 MMOL/L (ref 21–32)
CREAT BLD-MCNC: 0.69 MG/DL
DEPRECATED RDW RBC AUTO: 38.2 FL (ref 35.1–46.3)
EOSINOPHIL # BLD AUTO: 0.22 X10(3) UL (ref 0–0.7)
EOSINOPHIL NFR BLD AUTO: 3 %
ERYTHROCYTE [DISTWIDTH] IN BLOOD BY AUTOMATED COUNT: 11.9 % (ref 11–15)
EST. AVERAGE GLUCOSE BLD GHB EST-MCNC: 111 MG/DL (ref 68–126)
FASTING PATIENT LIPID ANSWER: NO
FASTING STATUS PATIENT QL REPORTED: NO
GFR SERPLBLD BASED ON 1.73 SQ M-ARVRAT: 112 ML/MIN/1.73M2 (ref 60–?)
GLOBULIN PLAS-MCNC: 3.4 G/DL (ref 2.8–4.4)
GLUCOSE BLD-MCNC: 86 MG/DL (ref 70–99)
HBA1C MFR BLD: 5.5 % (ref ?–5.7)
HCT VFR BLD AUTO: 38 %
HCV AB SERPL QL IA: NONREACTIVE
HDLC SERPL-MCNC: 39 MG/DL (ref 40–59)
HGB BLD-MCNC: 12.6 G/DL
IMM GRANULOCYTES # BLD AUTO: 0.02 X10(3) UL (ref 0–1)
IMM GRANULOCYTES NFR BLD: 0.3 %
LDLC SERPL CALC-MCNC: 98 MG/DL (ref ?–100)
LYMPHOCYTES # BLD AUTO: 2.36 X10(3) UL (ref 1–4)
LYMPHOCYTES NFR BLD AUTO: 31.8 %
MCH RBC QN AUTO: 29.3 PG (ref 26–34)
MCHC RBC AUTO-ENTMCNC: 33.2 G/DL (ref 31–37)
MCV RBC AUTO: 88.4 FL
MONOCYTES # BLD AUTO: 0.61 X10(3) UL (ref 0.1–1)
MONOCYTES NFR BLD AUTO: 8.2 %
NEUTROPHILS # BLD AUTO: 4.13 X10 (3) UL (ref 1.5–7.7)
NEUTROPHILS # BLD AUTO: 4.13 X10(3) UL (ref 1.5–7.7)
NEUTROPHILS NFR BLD AUTO: 55.6 %
NONHDLC SERPL-MCNC: 161 MG/DL (ref ?–130)
OSMOLALITY SERPL CALC.SUM OF ELEC: 289 MOSM/KG (ref 275–295)
PLATELET # BLD AUTO: 278 10(3)UL (ref 150–450)
POTASSIUM SERPL-SCNC: 3.8 MMOL/L (ref 3.5–5.1)
PROT SERPL-MCNC: 7.2 G/DL (ref 6.4–8.2)
RBC # BLD AUTO: 4.3 X10(6)UL
SODIUM SERPL-SCNC: 140 MMOL/L (ref 136–145)
T3FREE SERPL-MCNC: 3.18 PG/ML (ref 2.4–4.2)
T4 FREE SERPL-MCNC: 0.8 NG/DL (ref 0.8–1.7)
TRIGL SERPL-MCNC: 376 MG/DL (ref 30–149)
TSI SER-ACNC: 2.69 MIU/ML (ref 0.36–3.74)
VLDLC SERPL CALC-MCNC: 63 MG/DL (ref 0–30)
WBC # BLD AUTO: 7.4 X10(3) UL (ref 4–11)

## 2022-11-01 PROCEDURE — 83036 HEMOGLOBIN GLYCOSYLATED A1C: CPT

## 2022-11-01 PROCEDURE — 84443 ASSAY THYROID STIM HORMONE: CPT

## 2022-11-01 PROCEDURE — 3074F SYST BP LT 130 MM HG: CPT | Performed by: FAMILY MEDICINE

## 2022-11-01 PROCEDURE — 3008F BODY MASS INDEX DOCD: CPT | Performed by: FAMILY MEDICINE

## 2022-11-01 PROCEDURE — 80053 COMPREHEN METABOLIC PANEL: CPT

## 2022-11-01 PROCEDURE — 85025 COMPLETE CBC W/AUTO DIFF WBC: CPT

## 2022-11-01 PROCEDURE — 3078F DIAST BP <80 MM HG: CPT | Performed by: FAMILY MEDICINE

## 2022-11-01 PROCEDURE — 36415 COLL VENOUS BLD VENIPUNCTURE: CPT

## 2022-11-01 PROCEDURE — 84439 ASSAY OF FREE THYROXINE: CPT

## 2022-11-01 PROCEDURE — 99396 PREV VISIT EST AGE 40-64: CPT | Performed by: FAMILY MEDICINE

## 2022-11-01 PROCEDURE — 84481 FREE ASSAY (FT-3): CPT

## 2022-11-01 PROCEDURE — 80061 LIPID PANEL: CPT

## 2022-11-01 PROCEDURE — 86803 HEPATITIS C AB TEST: CPT

## 2022-11-28 ENCOUNTER — TELEMEDICINE (OUTPATIENT)
Dept: INTEGRATIVE MEDICINE | Facility: CLINIC | Age: 42
End: 2022-11-28

## 2022-11-28 DIAGNOSIS — R19.7 DIARRHEA, UNSPECIFIED TYPE: Primary | ICD-10-CM

## 2022-11-28 DIAGNOSIS — N80.9 ENDOMETRIOSIS: ICD-10-CM

## 2022-11-30 ENCOUNTER — PATIENT MESSAGE (OUTPATIENT)
Dept: INTEGRATIVE MEDICINE | Facility: CLINIC | Age: 42
End: 2022-11-30

## 2022-11-30 DIAGNOSIS — R63.5 WEIGHT GAIN: ICD-10-CM

## 2022-11-30 DIAGNOSIS — R73.01 ELEVATED FASTING BLOOD SUGAR: Primary | ICD-10-CM

## 2022-12-09 ENCOUNTER — LAB ENCOUNTER (OUTPATIENT)
Dept: LAB | Facility: REFERENCE LAB | Age: 42
End: 2022-12-09
Attending: FAMILY MEDICINE
Payer: COMMERCIAL

## 2022-12-09 ENCOUNTER — PATIENT MESSAGE (OUTPATIENT)
Dept: FAMILY MEDICINE CLINIC | Facility: CLINIC | Age: 42
End: 2022-12-09

## 2022-12-09 DIAGNOSIS — R73.01 ELEVATED FASTING BLOOD SUGAR: ICD-10-CM

## 2022-12-09 DIAGNOSIS — R63.5 WEIGHT GAIN: ICD-10-CM

## 2022-12-09 LAB
INSULIN SERPL-ACNC: 16.2 MU/L (ref 3–25)
THYROGLOB SERPL-MCNC: <15 U/ML (ref ?–60)
THYROPEROXIDASE AB SERPL-ACNC: 53 U/ML (ref ?–60)

## 2022-12-09 PROCEDURE — 83525 ASSAY OF INSULIN: CPT

## 2022-12-09 PROCEDURE — 86800 THYROGLOBULIN ANTIBODY: CPT

## 2022-12-09 PROCEDURE — 86376 MICROSOMAL ANTIBODY EACH: CPT

## 2022-12-09 PROCEDURE — 82397 CHEMILUMINESCENT ASSAY: CPT | Performed by: FAMILY MEDICINE

## 2022-12-09 PROCEDURE — 36415 COLL VENOUS BLD VENIPUNCTURE: CPT | Performed by: FAMILY MEDICINE

## 2022-12-11 NOTE — TELEPHONE ENCOUNTER
From: Jeremiah Sheehan  To: Berdine Phoenix, DO  Sent: 12/9/2022 2:10 PM CST  Subject: Question regarding THYROID ANTITHYROGLOBULIN AB    Thanks Dr. Carlton Husain. Can you tell me what my anti-thyroglobulin level means? My level of less than 15 ML seems low compared to the standard range of 60 ML or less. I am trying to understand what this means! :)    Happy holidays to your family!      Take care,  Lakhwinder Moreira

## 2022-12-12 LAB — LEPTIN: 25.2 NG/ML

## 2022-12-15 ENCOUNTER — NURSE TRIAGE (OUTPATIENT)
Dept: FAMILY MEDICINE CLINIC | Facility: CLINIC | Age: 42
End: 2022-12-15

## 2022-12-19 ENCOUNTER — OFFICE VISIT (OUTPATIENT)
Dept: FAMILY MEDICINE CLINIC | Facility: CLINIC | Age: 42
End: 2022-12-19
Payer: COMMERCIAL

## 2022-12-19 VITALS — SYSTOLIC BLOOD PRESSURE: 118 MMHG | OXYGEN SATURATION: 98 % | HEART RATE: 80 BPM | DIASTOLIC BLOOD PRESSURE: 72 MMHG

## 2022-12-19 DIAGNOSIS — J02.9 PHARYNGITIS, UNSPECIFIED ETIOLOGY: ICD-10-CM

## 2022-12-19 DIAGNOSIS — J06.9 VIRAL URI WITH COUGH: Primary | ICD-10-CM

## 2022-12-19 PROCEDURE — 3078F DIAST BP <80 MM HG: CPT | Performed by: FAMILY MEDICINE

## 2022-12-19 PROCEDURE — 99213 OFFICE O/P EST LOW 20 MIN: CPT | Performed by: FAMILY MEDICINE

## 2022-12-19 PROCEDURE — 3074F SYST BP LT 130 MM HG: CPT | Performed by: FAMILY MEDICINE

## 2022-12-19 RX ORDER — METHYLPREDNISOLONE 4 MG/1
TABLET ORAL
Qty: 1 EACH | Refills: 0 | Status: SHIPPED | OUTPATIENT
Start: 2022-12-22

## 2022-12-26 ENCOUNTER — PATIENT MESSAGE (OUTPATIENT)
Dept: FAMILY MEDICINE CLINIC | Facility: CLINIC | Age: 42
End: 2022-12-26

## 2022-12-27 ENCOUNTER — TELEPHONE (OUTPATIENT)
Dept: FAMILY MEDICINE CLINIC | Facility: CLINIC | Age: 42
End: 2022-12-27

## 2022-12-27 NOTE — TELEPHONE ENCOUNTER
Patient left message with phone service on 12/26/2022 at 4:46pm. Stated she seen Doctor last week and her cough is not getting better. Would like to discuss what she can take alongside her RX that was given by Doctor. Please follow up with patient.

## 2022-12-27 NOTE — TELEPHONE ENCOUNTER
Shayna Sharif RN 12/27/2022 11:06 AM CST        ----- Message -----  From: Jeremiah Sheehan  Sent: 12/26/2022 4:28 PM CST  To: Em Rn Triage  Subject: Cough     Hi Dr. Lissette Moses,    I have 3 more days of my steroid package. My sore throat feels a lot better. Anything you can recommend OTC that I can take at night to suppress my cough? Thanks.      Happy Karlos Degroot

## 2022-12-28 ENCOUNTER — PATIENT MESSAGE (OUTPATIENT)
Dept: FAMILY MEDICINE CLINIC | Facility: CLINIC | Age: 42
End: 2022-12-28

## 2022-12-28 DIAGNOSIS — J02.9 SORE THROAT: Primary | ICD-10-CM

## 2022-12-29 NOTE — TELEPHONE ENCOUNTER
Routed to Dr. Ros Samuels for review. Dr. Norbert Dickens out of office. Please advise on next step? LOV 12/19/22. 2. Pharyngitis, unspecified etiology     -More recent symptoms likely 2/2 viral URI. Initial sore throat possibly viral pharyngitis. Recommend continue supportive care. Continue flonase, and add daily antihistamine for rhinorrhea & postnasal drip as it may be contributing to sore throat. -If sore throat persists despite resolution of other URI symptoms, may trial medrol dose pack later this week prior to her travels. Rx sent. -Return precautions given.

## 2022-12-29 NOTE — TELEPHONE ENCOUNTER
Pt called this morning that she did receive a [employer] message that she has been referred to see a ENT. Pt was informed of  message below. Pt stated that she will not be able to see a ENT until several weeks she will make the appt but in the meantime pt will like to know what she can try otc. Pt is currently in [place] with her parents. Pt will be back in early Jan. Pt has been having this sore throat/ irritation since Thanksgiving. Pt has tried lozenges,claritin,flonase,gargle with warm salt water and finished the prednisone. Pt is wanting to know if there is anything else she can try OTC that can help her in the meantime. Please advise. RN please sent pt a [employer] message with MD reply.

## 2022-12-29 NOTE — TELEPHONE ENCOUNTER
I have no other suggestions other than Tylenol or Motrin as she has tried everything I would typically recommend for a sore throat. Could visit an urgent care in [place] if symptoms worsen for possible strep testing.

## 2022-12-30 NOTE — TELEPHONE ENCOUNTER
pt calling again and Pt wants to know if she should switch to Claritin D or try Allegra D which would be best for her to take for her congestion? Has been taking plain Claritin.  Please advise

## 2022-12-30 NOTE — TELEPHONE ENCOUNTER
Flonase can not cause rebound congestion that only occurs with Afrin.  Any decongestant antihistamine if find to try, including Allegra, Claritin, or Zyrtec-D.

## 2023-01-13 ENCOUNTER — OFFICE VISIT (OUTPATIENT)
Dept: OTOLARYNGOLOGY | Facility: CLINIC | Age: 43
End: 2023-01-13

## 2023-01-13 DIAGNOSIS — J32.9 PURULENT POST-NASAL DISCHARGE: ICD-10-CM

## 2023-01-13 DIAGNOSIS — R05.2 SUBACUTE COUGH: ICD-10-CM

## 2023-01-13 DIAGNOSIS — H91.8X9 ASYMMETRICAL HEARING LOSS: Primary | ICD-10-CM

## 2023-01-13 PROCEDURE — 99203 OFFICE O/P NEW LOW 30 MIN: CPT | Performed by: SPECIALIST

## 2023-01-13 RX ORDER — DOXYCYCLINE HYCLATE 100 MG/1
100 CAPSULE ORAL 2 TIMES DAILY
Qty: 20 CAPSULE | Refills: 0 | Status: SHIPPED | OUTPATIENT
Start: 2023-01-13

## 2023-01-13 NOTE — PATIENT INSTRUCTIONS
I placed you on a trial of doxycycline for your postnasal drainage and cough. I ordered an audiogram to better evaluate your underlying hearing loss. I will of course call you with the results.

## 2023-01-17 ENCOUNTER — PATIENT MESSAGE (OUTPATIENT)
Dept: OBGYN CLINIC | Facility: CLINIC | Age: 43
End: 2023-01-17

## 2023-01-17 NOTE — TELEPHONE ENCOUNTER
From: Kassidy Narayan  To: Zulema Cline DO  Sent: 1/17/2023 2:09 PM CST  Subject: Appointment on 1/18     Hi Dr. Leif Berg,    I am scheduled to see you tomorrow! I feel good and have had no debilitating cyclical symptoms since my surgery. Let me know if you still want to meet!      Ana María Rivera

## 2023-01-17 NOTE — TELEPHONE ENCOUNTER
From: Rachael Tamez  Sent: 1/17/2023 2:23 PM CST  To: [facility] Clinical Staff  Subject: Appointment on 1/18     Thanks. My last Pap Smear was about 1-1.5 years ago. Is three years the timeframe for Pap Smear checkups?

## 2023-02-12 ENCOUNTER — PATIENT MESSAGE (OUTPATIENT)
Facility: CLINIC | Age: 43
End: 2023-02-12

## 2023-02-13 NOTE — TELEPHONE ENCOUNTER
From: Sergio Wright  To: Gagandeep Larsen DO  Sent: 2/12/2023 5:37 PM CST  Subject: Lingering cold     Hi Dr. Gerardo Myrick,    I am on Day 8 of a cold (I tested negative for COVID). I am taking acetaminophen when needed but still feel feverish on and off even towards the end of my cold.  Assuming this is normal?     Thanks,  Apple De La Cruz

## 2023-02-15 ENCOUNTER — OFFICE VISIT (OUTPATIENT)
Facility: CLINIC | Age: 43
End: 2023-02-15
Payer: COMMERCIAL

## 2023-02-15 ENCOUNTER — NURSE TRIAGE (OUTPATIENT)
Facility: CLINIC | Age: 43
End: 2023-02-15

## 2023-02-15 VITALS
WEIGHT: 158 LBS | HEIGHT: 67.5 IN | SYSTOLIC BLOOD PRESSURE: 130 MMHG | DIASTOLIC BLOOD PRESSURE: 78 MMHG | OXYGEN SATURATION: 98 % | HEART RATE: 106 BPM | BODY MASS INDEX: 24.51 KG/M2

## 2023-02-15 DIAGNOSIS — M94.0 COSTOCHONDRITIS, ACUTE: Primary | ICD-10-CM

## 2023-02-15 PROCEDURE — 99213 OFFICE O/P EST LOW 20 MIN: CPT | Performed by: FAMILY MEDICINE

## 2023-02-15 PROCEDURE — 3075F SYST BP GE 130 - 139MM HG: CPT | Performed by: FAMILY MEDICINE

## 2023-02-15 PROCEDURE — 3008F BODY MASS INDEX DOCD: CPT | Performed by: FAMILY MEDICINE

## 2023-02-15 PROCEDURE — 3078F DIAST BP <80 MM HG: CPT | Performed by: FAMILY MEDICINE

## 2023-02-15 RX ORDER — NAPROXEN 500 MG/1
500 TABLET ORAL 2 TIMES DAILY WITH MEALS
Qty: 28 TABLET | Refills: 0 | Status: SHIPPED | OUTPATIENT
Start: 2023-02-15 | End: 2023-02-19 | Stop reason: ALTCHOICE

## 2023-02-16 ENCOUNTER — TELEMEDICINE (OUTPATIENT)
Dept: INTEGRATIVE MEDICINE | Facility: CLINIC | Age: 43
End: 2023-02-16
Payer: COMMERCIAL

## 2023-02-16 DIAGNOSIS — R19.7 DIARRHEA, UNSPECIFIED TYPE: ICD-10-CM

## 2023-02-16 DIAGNOSIS — R73.01 ELEVATED FASTING BLOOD SUGAR: Primary | ICD-10-CM

## 2023-02-16 DIAGNOSIS — R63.5 WEIGHT GAIN: ICD-10-CM

## 2023-02-16 DIAGNOSIS — F41.1 GENERALIZED ANXIETY DISORDER: ICD-10-CM

## 2023-02-16 RX ORDER — TOPIRAMATE 25 MG/1
25 TABLET ORAL DAILY
Qty: 30 TABLET | Refills: 0 | Status: SHIPPED | OUTPATIENT
Start: 2023-02-16

## 2023-02-16 RX ORDER — BUPROPION HYDROCHLORIDE 150 MG/1
150 TABLET ORAL DAILY
COMMUNITY

## 2023-02-17 ENCOUNTER — TELEPHONE (OUTPATIENT)
Facility: CLINIC | Age: 43
End: 2023-02-17

## 2023-02-17 ENCOUNTER — HOSPITAL ENCOUNTER (OUTPATIENT)
Dept: GENERAL RADIOLOGY | Age: 43
Discharge: HOME | End: 2023-02-17
Attending: FAMILY MEDICINE
Payer: COMMERCIAL

## 2023-02-17 DIAGNOSIS — R07.81 RIB PAIN ON RIGHT SIDE: ICD-10-CM

## 2023-02-17 DIAGNOSIS — R07.81 RIB PAIN ON RIGHT SIDE: Primary | ICD-10-CM

## 2023-02-17 PROCEDURE — 71100 X-RAY EXAM RIBS UNI 2 VIEWS: CPT | Performed by: FAMILY MEDICINE

## 2023-02-17 RX ORDER — LIDOCAINE 50 MG/G
1 PATCH TOPICAL EVERY 24 HOURS
Qty: 30 EACH | Refills: 0 | Status: SHIPPED | OUTPATIENT
Start: 2023-02-17

## 2023-02-17 NOTE — TELEPHONE ENCOUNTER
Rib x-ray ordered and lidocaine patches sent to pharmacy. Will consider switching to Medrol pack instead of Naproxen if not helping but await x-ray results.

## 2023-02-17 NOTE — TELEPHONE ENCOUNTER
Dr. Yoo Morning: please advise on pain relief? Does she need xray? Patient C/o severe pain 8 of 10; has Constant pain on Rt side under rib cage; hurts to walk, and with any type of movement. Pain with coughing. Pain increasing as of yesterday. Very painful to lay down, and get out of bed. Has heating pad on; using naproxen as prescribed. No sob. Patient asking what else can she do to help with the pain; she has young children and unable to function.

## 2023-03-23 ENCOUNTER — NURSE TRIAGE (OUTPATIENT)
Facility: CLINIC | Age: 43
End: 2023-03-23

## 2023-06-03 ENCOUNTER — PATIENT MESSAGE (OUTPATIENT)
Facility: CLINIC | Age: 43
End: 2023-06-03

## 2023-06-04 NOTE — TELEPHONE ENCOUNTER
From: Yoselyn Sandoval  To: Dereck Covarrubias DO  Sent: 6/3/2023 3:51 PM CDT  Subject: Acid reflux     Hi Dr. Billie Larry had acid reflux for a couple of weeks now. Tums do not help. It happens a couple of times per day. Thoughts? Should I make an office visit?     Thanks,  Kristen Rodriguez

## 2023-06-07 ENCOUNTER — TELEMEDICINE (OUTPATIENT)
Dept: SURGERY | Facility: CLINIC | Age: 43
End: 2023-06-07
Payer: COMMERCIAL

## 2023-06-07 VITALS — WEIGHT: 165 LBS | BODY MASS INDEX: 25.59 KG/M2 | HEIGHT: 67.5 IN

## 2023-06-07 DIAGNOSIS — F41.1 GENERALIZED ANXIETY DISORDER: ICD-10-CM

## 2023-06-07 DIAGNOSIS — R63.5 WEIGHT GAIN: ICD-10-CM

## 2023-06-07 DIAGNOSIS — F32.A DEPRESSION, UNSPECIFIED DEPRESSION TYPE: ICD-10-CM

## 2023-06-07 DIAGNOSIS — E66.3 PATIENT OVERWEIGHT: Primary | ICD-10-CM

## 2023-06-07 DIAGNOSIS — E66.9 OBESITY (BMI 30-39.9): ICD-10-CM

## 2023-06-07 PROCEDURE — 99214 OFFICE O/P EST MOD 30 MIN: CPT | Performed by: NURSE PRACTITIONER

## 2023-06-07 RX ORDER — VENLAFAXINE 75 MG/1
75 TABLET ORAL DAILY
COMMUNITY

## 2023-06-07 NOTE — PATIENT INSTRUCTIONS
Cut down on dairy. Daily Calories:  120-174 lbs: 1200 calories/day. 175-219 lbs: 1500 calories/day. 220-249 lbs: 1800 calories/day. 250 lbs or more: 2,000 calories/day. Lose It. 60 grams protein/day. Carbs: 100 grams/day.     6: 1    I recommend a whole food, plant powered diet with low glycemic index:     Aim for 3 meals a day and 1-2 snacks as needed. Aim for a protein + produce at each meal time. Keep meals between 7 am and 7 pm.     Breakfast ideas:  1. Fruit and nuts/seeds. 2. Eggs scrambled with vegetables. 3. Oatmeal (stovetop), cinnamon, 2 tbsp flaxseed, berries, nuts. 4. Protein shake + fruit. (1 scoop with water/ice). [place] [address], [place], [place], and [place] are good brands. Snacks:  Raw vegetables and hummus, apples and peanut butter, nuts, seeds, fruit, pecans drizzled with organic honey. Use the Healthy Plate method for lunch and dinner:  1/2 right side of plate non-starchy vegetables. Bottom left 1/4 plate protein. Top left 1/4 starch as desired. Aim for a total of:  2 fruits a day (avocado, tomato, citrus/oranges, apples, berries)  1/2 cup or medium size    4 non-starchy vegetables (handful) (greens, peppers, onions, garlic, broccoli, cauliflower, etc.)    0-2 starches (oatmeal, sweet potato, carrots, brown rice, etc). 3 protein per day (fish, seafood, meat, or plants: salmon, nuts, seeds, shrimp, chicken, turkey, beans, lentils, chickpeas, etc).     2 healthy fats (avocado, avocado oil, olives, olive oil, salmon, nuts, seeds)

## 2023-06-10 NOTE — TELEPHONE ENCOUNTER
OFFICE STAFF=please assists.        Future Appointments   Date Time Provider Mary Naranjo   6/15/2023  6:30 PM Jose Enrique Serrano DO [facility]

## 2023-06-15 ENCOUNTER — TELEMEDICINE (OUTPATIENT)
Facility: CLINIC | Age: 43
End: 2023-06-15
Payer: COMMERCIAL

## 2023-06-15 DIAGNOSIS — R10.13 DYSPEPSIA: Primary | ICD-10-CM

## 2023-06-15 PROCEDURE — 99213 OFFICE O/P EST LOW 20 MIN: CPT | Performed by: FAMILY MEDICINE

## 2023-06-15 RX ORDER — FAMOTIDINE 20 MG/1
20 TABLET, FILM COATED ORAL 2 TIMES DAILY
Qty: 60 TABLET | Refills: 0 | Status: SHIPPED | OUTPATIENT
Start: 2023-06-15

## 2023-06-15 RX ORDER — HYDROXYZINE HYDROCHLORIDE 25 MG/1
TABLET, FILM COATED ORAL
COMMUNITY
Start: 2023-05-15

## 2023-08-09 ENCOUNTER — NURSE TRIAGE (OUTPATIENT)
Facility: CLINIC | Age: 43
End: 2023-08-09

## 2023-08-09 NOTE — TELEPHONE ENCOUNTER
Action Requested: Summary for Provider     []  Critical Lab, Recommendations Needed  [] Need Additional Advice  [x]   FYI    []   Need Orders  [] Need Medications Sent to Pharmacy  []  Other     SUMMARY: Patient states for past 1-2 weeks she feels lightheaded, denies room is spinning or tilting. Patient denies headache,nausea,numbness or weakness on one side of the body, speech is clear. Patient states she is a able to walk,drive, and is taking care of her children. She does not check her blood pressure. Denies pregnancy    Patient refusing immediate care, only wants to be seen in the office. Patient asking to be seen and only in [facility] location. Soonest appointment scheduled with SOPHY Brown. Patient advised to stay hydrated, stand up slowly, monitor blood pressure and  to call back if any new or worsening symptoms.          Reason for call: Dizziness  Onset: 1-2 weeks  Reason for Disposition   Patient wants to be seen    Protocols used: Delmy Rahman

## 2023-08-10 ENCOUNTER — PATIENT MESSAGE (OUTPATIENT)
Dept: OBGYN CLINIC | Facility: CLINIC | Age: 43
End: 2023-08-10

## 2023-08-10 ENCOUNTER — HOSPITAL ENCOUNTER (OUTPATIENT)
Dept: MAMMOGRAPHY | Facility: HOSPITAL | Age: 43
Discharge: HOME | End: 2023-08-10
Attending: OBSTETRICS & GYNECOLOGY
Payer: COMMERCIAL

## 2023-08-10 DIAGNOSIS — Z12.31 VISIT FOR SCREENING MAMMOGRAM: Primary | ICD-10-CM

## 2023-08-10 DIAGNOSIS — R92.8 ABNORMAL MAMMOGRAM OF LEFT BREAST: ICD-10-CM

## 2023-08-16 ENCOUNTER — LAB ENCOUNTER (OUTPATIENT)
Dept: LAB | Age: 43
End: 2023-08-16
Payer: COMMERCIAL

## 2023-08-16 ENCOUNTER — OFFICE VISIT (OUTPATIENT)
Dept: FAMILY MEDICINE CLINIC | Facility: CLINIC | Age: 43
End: 2023-08-16

## 2023-08-16 VITALS — WEIGHT: 160 LBS | BODY MASS INDEX: 25.11 KG/M2 | OXYGEN SATURATION: 97 % | HEIGHT: 67 IN | TEMPERATURE: 97 F

## 2023-08-16 DIAGNOSIS — R53.83 FATIGUE, UNSPECIFIED TYPE: ICD-10-CM

## 2023-08-16 DIAGNOSIS — E55.9 VITAMIN D DEFICIENCY: ICD-10-CM

## 2023-08-16 DIAGNOSIS — R42 DIZZINESS: Primary | ICD-10-CM

## 2023-08-16 DIAGNOSIS — R42 DIZZINESS: ICD-10-CM

## 2023-08-16 PROBLEM — R63.5 WEIGHT GAIN: Status: RESOLVED | Noted: 2023-06-07 | Resolved: 2023-08-16

## 2023-08-16 PROBLEM — E66.3 PATIENT OVERWEIGHT: Status: RESOLVED | Noted: 2023-06-07 | Resolved: 2023-08-16

## 2023-08-16 LAB
ALBUMIN SERPL-MCNC: 3.9 G/DL (ref 3.4–5)
ALBUMIN/GLOB SERPL: 1 {RATIO} (ref 1–2)
ALP LIVER SERPL-CCNC: 105 U/L
ALT SERPL-CCNC: 33 U/L
ANION GAP SERPL CALC-SCNC: 7 MMOL/L (ref 0–18)
AST SERPL-CCNC: 17 U/L (ref 15–37)
BASOPHILS # BLD AUTO: 0.07 X10(3) UL (ref 0–0.2)
BASOPHILS NFR BLD AUTO: 0.9 %
BILIRUB SERPL-MCNC: 0.2 MG/DL (ref 0.1–2)
BUN BLD-MCNC: 10 MG/DL (ref 7–18)
BUN/CREAT SERPL: 14.9 (ref 10–20)
CALCIUM BLD-MCNC: 9 MG/DL (ref 8.5–10.1)
CHLORIDE SERPL-SCNC: 105 MMOL/L (ref 98–112)
CO2 SERPL-SCNC: 27 MMOL/L (ref 21–32)
CREAT BLD-MCNC: 0.67 MG/DL
DEPRECATED RDW RBC AUTO: 38.7 FL (ref 35.1–46.3)
EGFRCR SERPLBLD CKD-EPI 2021: 113 ML/MIN/1.73M2 (ref 60–?)
EOSINOPHIL # BLD AUTO: 0.26 X10(3) UL (ref 0–0.7)
EOSINOPHIL NFR BLD AUTO: 3.3 %
ERYTHROCYTE [DISTWIDTH] IN BLOOD BY AUTOMATED COUNT: 12 % (ref 11–15)
EST. AVERAGE GLUCOSE BLD GHB EST-MCNC: 120 MG/DL (ref 68–126)
FASTING STATUS PATIENT QL REPORTED: NO
GLOBULIN PLAS-MCNC: 3.8 G/DL (ref 2.8–4.4)
GLUCOSE BLD-MCNC: 106 MG/DL (ref 70–99)
HBA1C MFR BLD: 5.8 % (ref ?–5.7)
HCT VFR BLD AUTO: 40 %
HGB BLD-MCNC: 13.1 G/DL
IMM GRANULOCYTES # BLD AUTO: 0.01 X10(3) UL (ref 0–1)
IMM GRANULOCYTES NFR BLD: 0.1 %
LYMPHOCYTES # BLD AUTO: 2.47 X10(3) UL (ref 1–4)
LYMPHOCYTES NFR BLD AUTO: 31.8 %
MCH RBC QN AUTO: 28.8 PG (ref 26–34)
MCHC RBC AUTO-ENTMCNC: 32.8 G/DL (ref 31–37)
MCV RBC AUTO: 87.9 FL
MONOCYTES # BLD AUTO: 0.62 X10(3) UL (ref 0.1–1)
MONOCYTES NFR BLD AUTO: 8 %
NEUTROPHILS # BLD AUTO: 4.34 X10 (3) UL (ref 1.5–7.7)
NEUTROPHILS # BLD AUTO: 4.34 X10(3) UL (ref 1.5–7.7)
NEUTROPHILS NFR BLD AUTO: 55.9 %
OSMOLALITY SERPL CALC.SUM OF ELEC: 287 MOSM/KG (ref 275–295)
PLATELET # BLD AUTO: 297 10(3)UL (ref 150–450)
POTASSIUM SERPL-SCNC: 4 MMOL/L (ref 3.5–5.1)
PROT SERPL-MCNC: 7.7 G/DL (ref 6.4–8.2)
RBC # BLD AUTO: 4.55 X10(6)UL
SODIUM SERPL-SCNC: 139 MMOL/L (ref 136–145)
TSI SER-ACNC: 1.76 MIU/ML (ref 0.36–3.74)
VIT D+METAB SERPL-MCNC: 36.4 NG/ML (ref 30–100)
WBC # BLD AUTO: 7.8 X10(3) UL (ref 4–11)

## 2023-08-16 PROCEDURE — 85025 COMPLETE CBC W/AUTO DIFF WBC: CPT

## 2023-08-16 PROCEDURE — 82306 VITAMIN D 25 HYDROXY: CPT

## 2023-08-16 PROCEDURE — 84443 ASSAY THYROID STIM HORMONE: CPT

## 2023-08-16 PROCEDURE — 99213 OFFICE O/P EST LOW 20 MIN: CPT

## 2023-08-16 PROCEDURE — 36415 COLL VENOUS BLD VENIPUNCTURE: CPT

## 2023-08-16 PROCEDURE — 83036 HEMOGLOBIN GLYCOSYLATED A1C: CPT

## 2023-08-16 PROCEDURE — 80053 COMPREHEN METABOLIC PANEL: CPT

## 2023-08-16 PROCEDURE — 3008F BODY MASS INDEX DOCD: CPT

## 2023-08-16 RX ORDER — VENLAFAXINE HYDROCHLORIDE 75 MG/1
75 CAPSULE, EXTENDED RELEASE ORAL DAILY
COMMUNITY
Start: 2023-08-10

## 2023-08-16 RX ORDER — VENLAFAXINE HYDROCHLORIDE 37.5 MG/1
37.5 CAPSULE, EXTENDED RELEASE ORAL DAILY
COMMUNITY
Start: 2023-08-09

## 2023-08-17 ENCOUNTER — PATIENT MESSAGE (OUTPATIENT)
Dept: FAMILY MEDICINE CLINIC | Facility: CLINIC | Age: 43
End: 2023-08-17

## 2023-08-17 NOTE — TELEPHONE ENCOUNTER
From: Carrie Sevilla  To: SOPHY Elmore  Sent: 8/17/2023 9:44 AM CDT  Subject: Test follow up     Nice to meet you, Stephania Stauffer! I noticed my A1C was slightly elevated (I did not fast). Should I be concerned?      Thanks,  Poonam Mccormick

## 2023-09-27 ENCOUNTER — OFFICE VISIT (OUTPATIENT)
Dept: OBGYN CLINIC | Facility: CLINIC | Age: 43
End: 2023-09-27
Payer: COMMERCIAL

## 2023-09-27 VITALS — DIASTOLIC BLOOD PRESSURE: 82 MMHG | SYSTOLIC BLOOD PRESSURE: 128 MMHG | HEIGHT: 67.5 IN | BODY MASS INDEX: 25 KG/M2

## 2023-09-27 DIAGNOSIS — K46.9 HERNIA: Primary | ICD-10-CM

## 2023-09-27 DIAGNOSIS — Z12.4 PAP SMEAR FOR CERVICAL CANCER SCREENING: ICD-10-CM

## 2023-09-27 DIAGNOSIS — Z01.419 VISIT FOR GYNECOLOGIC EXAMINATION: Primary | ICD-10-CM

## 2023-09-27 DIAGNOSIS — Z12.4 SCREENING FOR CERVICAL CANCER: ICD-10-CM

## 2023-09-27 PROCEDURE — 3079F DIAST BP 80-89 MM HG: CPT | Performed by: OBSTETRICS & GYNECOLOGY

## 2023-09-27 PROCEDURE — 3074F SYST BP LT 130 MM HG: CPT | Performed by: OBSTETRICS & GYNECOLOGY

## 2023-09-27 PROCEDURE — 99396 PREV VISIT EST AGE 40-64: CPT | Performed by: OBSTETRICS & GYNECOLOGY

## 2023-09-27 PROCEDURE — 3008F BODY MASS INDEX DOCD: CPT | Performed by: OBSTETRICS & GYNECOLOGY

## 2023-09-27 PROCEDURE — 87624 HPV HI-RISK TYP POOLED RSLT: CPT | Performed by: OBSTETRICS & GYNECOLOGY

## 2023-09-27 PROCEDURE — 88175 CYTOPATH C/V AUTO FLUID REDO: CPT | Performed by: OBSTETRICS & GYNECOLOGY

## 2023-09-27 NOTE — PROGRESS NOTES
ANNUAL GYN EXAM  [facility]    CHIEF COMPLAINT:  Patient presents with: Annual     HISTORY OF PRESENT ILLNESS:   Richard Jimenez is a 40year old female [id number]  who presents for annual well woman visit. She is feeling well. Annual exam    Patient's last menstrual period was 2023 (exact date). Monthly / regular    Regular BM    + sex, + condoms  No bleeding with sex  No new partners    Exercise: 3-4 times per week  Diet: fine, glucose was a little elevated with recent checkup so is watching sugar intake  Mood: fine    Has diastasis recti and is not getting better despite ab work. Is painful sometimes. PAST MEDICAL HISTORY:   Past Medical History:   Diagnosis Date    Anxiety     Depression     PUPP (pruritic urticarial papules and plaques of pregnancy)         PAST SURGICAL HISTORY:   Past Surgical History:   Procedure Laterality Date          2018    Cyst aspiration left      Done at [facility] in [place]    Hc  section level i        2018, 2013    Duncan teeth removed          PAST OB HISTORY:  OB History    Para Term  AB Living   2 2 2     2   SAB IAB Ectopic Multiple Live Births           1      # Outcome Date GA Lbr Solo/2nd Weight Sex Delivery Anes PTL Lv   2 Term 18 39w0d  8 lb 11 oz (3.94 kg) M CS-LTranv Spinal  KRIS   1 Term 13   8 lb 6 oz (3.799 kg) M Vag-Forceps          CURRENT MEDICATIONS:      Current Outpatient Medications:     tretinoin 0.025 % External Cream, , Disp: , Rfl:     venlafaxine ER 37.5 MG Oral Capsule SR 24 Hr, Take 1 capsule (37.5 mg total) by mouth daily. , Disp: , Rfl:     venlafaxine ER 75 MG Oral Capsule SR 24 Hr, Take 1 capsule (75 mg total) by mouth daily. TAKE 1 CAPSULE BY MOUTH DAILY WITH FOOD.  TAKE WITH THE 37.5MG CAPSULE FOR TOTAL DAILY DOSE .5MG, Disp: , Rfl:     Multiple Vitamin (MULTI-DAY) Oral Tab, Take by mouth every morning., Disp: , Rfl:     ALLERGIES:    Erythromycin            RASH Comment:Cerner Allergy Text Annotation: erythromycin    SOCIAL HISTORY:  Social History    Socioeconomic History      Marital status:     Tobacco Use      Smoking status: Never      Smokeless tobacco: Never    Vaping Use      Vaping Use: Never used    Substance and Sexual Activity      Alcohol use: Yes        Comment: minimal      Drug use: Never      Sexual activity: Yes        Partners: Male        Birth control/protection: Condom    Other Topics      Concerns:        Caffeine Concern: No        Exercise: Yes        Seat Belt: No        Special Diet: No        Stress Concern: No        Weight Concern: Yes        Blood Transfusions: No      FAMILY HISTORY:  Family History   Problem Relation Age of Onset    Thyroid disease Mother         Hyperthyroidism     No Known Problems Father     No Known Problems Sister     Thyroid disease Maternal Grandfather     No Known Problems Son      ASSESSMENTS:  REVIEW OF SYSTEMS:  CONSTITUTIONAL:  negative for fevers, chills and sweats    EYES:  negative for  blurred vision and visual disturbance  RESPIRATORY:  negative for  cough and shortness of breath  CARDIOVASCULAR:  negative for  chest pain, palpitations  GASTROINTESTINAL:  No constipation/diarrhea, no pain  GENITOURINARY:  See History of Present Illness  INTEGUMENT/BREAST: Breast: no masses, no nipple discharge  ENDOCRINE:  negative for acne, constipation, diarrhea, cold intolerance, heat intolerance, fatigue, hair loss, weight gain and weight loss  MUSCULOSKELETAL:  negative for joint pain  NEUROLOGICAL:  negative for dizziness/lightheadedness and headaches  BEHAVIOR/PSYCH:  Negative for depressed mood, anhedonia and anxiety    PHYSICAL EXAM  Patient's last menstrual period was 09/02/2023 (exact date).     09/27/23  1234   BP: 128/82   Height: 67.5\"       CONSTITUTIONAL: Awake, alert, cooperative, no apparent distress, and appears stated age   NECK: Supple, symmetrical, trachea midline, no adenopathy, thyroid symmetric, not enlarged and no tenderness  LUNGS: no excess work of breathing  ABDOMEN: Soft, non-distended, non-tender, no masses palpated; bulge superior to umbilicus, with a crunch. Nonpainful to palpation. CHEST/BREASTS: Breasts symmetrical, skin without lesion(s), no nipple retraction or dimpling, no nipple discharge, no masses palpated, no axillary or supraclavicular adenopathy  GENITAL/URINARY:    External Genitalia:  General appearance; normal, Hair distribution; normal, Lesions absent   Urethral Meatus:  Lesions absent, Prolapse absent  Bladder:  Tenderness absent, Cystocele absent  Vagina:  Discharge absent, Lesions absent, Pelvic support normal  Cervix:  Lesions absent, Discharge absent, Tenderness absent  Uterus:  Size normal, Masses absent, Tenderness absent  Adnexa: Masses absent, Tenderness absent  Anus/Perineum:  Lesions absent    MUSCULOSKELETAL: There is no redness, warmth, or swelling of the joints. Tone is normal.  NEUROLOGIC: Patient is awake, alert and oriented to name, place and time. Casual gait is normal.  SKIN: no bruising or bleeding and no rashes  PSYCHIATRIC: Behavior:  Appropriate  Mood:  appropriate  ASSESSMENT AND PLAN:  1. Visit for gynecologic examination  - CBE and pelvic exam with pap /hpv today. Self breast awareness discussed. - the bulge on abdomen is suspicious for ventral hernia, less likely diastasis. As it is painful sometimes, recommend further work-up, consider CT abdomen / consult with gen surg. 2. Screening for cervical cancer  - ThinPrep PAP Smear; Future  - Hpv Dna  High Risk , Thin Prep Collect;  Future    follow up PRN or 1 year  Alanna Chavez DO

## 2023-09-28 LAB — HPV I/H RISK 1 DNA SPEC QL NAA+PROBE: NEGATIVE

## 2023-10-04 ENCOUNTER — HOSPITAL ENCOUNTER (OUTPATIENT)
Dept: MAMMOGRAPHY | Age: 43
Discharge: HOME | End: 2023-10-04
Attending: OBSTETRICS & GYNECOLOGY
Payer: COMMERCIAL

## 2023-10-04 DIAGNOSIS — Z12.31 VISIT FOR SCREENING MAMMOGRAM: ICD-10-CM

## 2023-10-04 PROCEDURE — 77063 BREAST TOMOSYNTHESIS BI: CPT | Performed by: OBSTETRICS & GYNECOLOGY

## 2023-10-04 PROCEDURE — 77067 SCR MAMMO BI INCL CAD: CPT | Performed by: OBSTETRICS & GYNECOLOGY

## 2023-10-17 ENCOUNTER — HOSPITAL ENCOUNTER (OUTPATIENT)
Dept: MAMMOGRAPHY | Facility: HOSPITAL | Age: 43
Discharge: HOME | End: 2023-10-17
Attending: OBSTETRICS & GYNECOLOGY
Payer: COMMERCIAL

## 2023-10-17 ENCOUNTER — HOSPITAL ENCOUNTER (OUTPATIENT)
Dept: ULTRASOUND IMAGING | Facility: HOSPITAL | Age: 43
Discharge: HOME | End: 2023-10-17
Attending: OBSTETRICS & GYNECOLOGY
Payer: COMMERCIAL

## 2023-10-17 DIAGNOSIS — R92.8 ABNORMAL MAMMOGRAM: ICD-10-CM

## 2023-10-17 PROCEDURE — 76642 ULTRASOUND BREAST LIMITED: CPT | Performed by: OBSTETRICS & GYNECOLOGY

## 2023-10-17 PROCEDURE — 77061 BREAST TOMOSYNTHESIS UNI: CPT | Performed by: OBSTETRICS & GYNECOLOGY

## 2023-10-17 PROCEDURE — 77065 DX MAMMO INCL CAD UNI: CPT | Performed by: OBSTETRICS & GYNECOLOGY

## 2023-10-18 NOTE — DISCHARGE INSTRUCTIONS
The Doctor (Radiologist) who performed your procedure was: DR Ifeanyi Motta Dr an ice pack over the biopsy site on top of your bra or on top of the ACE wrap (never apply ice directly over skin) for 10-15 minutes of every hour until bedtime for your comfort and to decrease bleeding. Keep your sports bra or the ACE wrap (stereotactic and MRI biopsy) in place for 24 hours after your biopsy. This compression decreases bleeding and breast movement for your comfort. Wear a supportive bra for the next couple of days for comfort (sports bra for sleep). Continue to wear, preferably, a sports bra or good supportive bra for 1 week and take off only to shower. No baths or showers during the first 24 hours after biopsy. After this time you may take a shower. It's okay if the strips get wet but do not soak them. NO saunas, hot tubs or swimming until steri-strips fall off (approx. 5 days). This prevents infection and allows time for them to completely close and heal.  DO NOT remove the steri-strips. They will fall off in 5 days. If any type of irritation (redness, itching or blisters) develops in the area around the steri-strips, remove them gently. If the steri-strips do not fall off after 5 days, gently remove them. Keep the area clean and dry. It is normal to have mild discomfort and bruising at the biopsy site. You may take Tylenol as needed for discomfort, as long as you have no allergies to Tylenol. Do not take aspirin, motrin, ibuprofen or any medication containing NSAID (non-steroidal anti-inflammatory drug) product for 48 hours. DO NOT participate in strenuous activity (aerobics, heavy lifting, housework, gardening, etc.) 48 hours after your biopsy to prevent bleeding. You will receive results in 2-3 business days. If you are having an MRI breast biopsy or an Ultrasound guided breast biopsy, you will be billed for the biopsy and unilateral mammogram separately.   If you have any questions about the procedure or your results, please contact the Breast Care Coordinator Nurse at [phone]. Notify your ordering physician or primary physician for increased bleeding, pain or fever over 100. Or contact a Radiology Nurse at [phone] between 8am-4pm (after 4pm, your call will be directed to the [facility]).

## 2023-10-19 ENCOUNTER — HOSPITAL ENCOUNTER (OUTPATIENT)
Dept: MAMMOGRAPHY | Facility: HOSPITAL | Age: 43
Discharge: HOME | End: 2023-10-19
Attending: OBSTETRICS & GYNECOLOGY
Payer: COMMERCIAL

## 2023-10-19 ENCOUNTER — HOSPITAL ENCOUNTER (OUTPATIENT)
Dept: ULTRASOUND IMAGING | Facility: HOSPITAL | Age: 43
Discharge: HOME | End: 2023-10-19
Attending: OBSTETRICS & GYNECOLOGY
Payer: COMMERCIAL

## 2023-10-19 DIAGNOSIS — N63.10 BREAST MASS, RIGHT: ICD-10-CM

## 2023-10-19 PROCEDURE — 88342 IMHCHEM/IMCYTCHM 1ST ANTB: CPT | Performed by: OBSTETRICS & GYNECOLOGY

## 2023-10-19 PROCEDURE — 19083 BX BREAST 1ST LESION US IMAG: CPT | Performed by: OBSTETRICS & GYNECOLOGY

## 2023-10-19 PROCEDURE — 77065 DX MAMMO INCL CAD UNI: CPT | Performed by: OBSTETRICS & GYNECOLOGY

## 2023-10-19 PROCEDURE — 88305 TISSUE EXAM BY PATHOLOGIST: CPT | Performed by: OBSTETRICS & GYNECOLOGY

## 2023-10-19 NOTE — IMAGING NOTE
1100 Pt arrived to room #4 .  scans taken by KILEY ultrasound technologist     Hx taken and is as follows: PER IMAGING NOTE  1.7 cm ill-defined hypoechoic lesion right breast 9:30 o'clock 5 cm from the nipple which appears to correspond with the mammographic distortion. Ultrasound-guided biopsy is recommended. 1124 Consent verified and obtained     1150 Imaging Completed by Dr Alvaro Shukla     1151 Time out taken     1200 Skin prep with chloro prep sterile towels to site. Site marked RIGHT BREAST     1203 Lidocaine  2% 10 milligrams per ml given from kit  total amount  3ml given. 1203 Lidocaine 1% with epinephrine  1:100,000 units 200 milligrams per  20 ml given total amount 5  ml given. 214 Punctil biopsy device   to be used for core samples     Core # 1 at 1205  all cores to be placed in sterile cup with 10 ml ns     Total amount cores taken 8 placed in formalin at 1210    1210    VISION Clip placed      1212:  Procedure completed. Pressure to site . No active bleeding noted. Area cleaned steri strips to site. Ice pack to site . 1228  Post instructions given verbal et written. Avs summary sheet provided to patient. Patient verbalizes understanding and agreement . 1230 To mammography department  for post clip images . Report to SYLVIA  Mammography technologist. Mammography department to discharge patient after images completed.     1235 Specimen taken to pathology by Jamarcus Tomas RN

## 2023-10-20 ENCOUNTER — TELEPHONE (OUTPATIENT)
Dept: OBGYN CLINIC | Facility: CLINIC | Age: 43
End: 2023-10-20

## 2023-10-20 NOTE — TELEPHONE ENCOUNTER
Patient is calling requesting to speak to RD in regards to test results ultrasound and Biopsy from 10/19/2023.  Please assist

## 2023-10-23 ENCOUNTER — TELEPHONE (OUTPATIENT)
Dept: MAMMOGRAPHY | Facility: HOSPITAL | Age: 43
End: 2023-10-23

## 2023-10-23 ENCOUNTER — TELEPHONE (OUTPATIENT)
Dept: OBGYN CLINIC | Facility: CLINIC | Age: 43
End: 2023-10-23

## 2023-10-23 DIAGNOSIS — Z12.31 BREAST CANCER SCREENING BY MAMMOGRAM: Primary | ICD-10-CM

## 2023-10-23 NOTE — TELEPHONE ENCOUNTER
Patient is requesting to speak to Dr. Dionicio Dinh in regards of mammogram test result, per patient needs for provider to call her within 24 hours for suggestions of surgeons for a excisional biopsy. Please follow up with patient.

## 2023-10-23 NOTE — TELEPHONE ENCOUNTER
Sarah Lin is s/p biopsy . Phoned and introduced myself as breast coordinator . Reinforced to patient  post biopsy care and instructions . She was informed of surgical consult for excision needed fir discordant results. She is going to obtain a surgeons name and call and make an appt  for excisional biopsy. No c/o post procedure. Informed  and shared the pathology results as well as the recommendations from Dr Radha Garcia for her breast imaging  as follows:      Pathology results shared (see epic for dictated pathology and radiology procedure report)  and recommendations are as follows:     RECOMMENDATION:  Surgical excision of the previously biopsied right breast mass at 09:30 o'clock 5 centimeters from the nipple marked with a vision shaped biopsy clip corresponding to a mammographic area of architectural distortion due to discordance   between imaging and pathology findings. Fatimah Anabel the above and denies questions. Sarah Lin was also instructed to perform breast self exams and if any changes  develops any changes to contact ordering  physician immediately  for re evaluation. .  Maria Guadalupe Velasquez understanding and agreement.

## 2023-10-24 ENCOUNTER — TELEPHONE (OUTPATIENT)
Dept: MAMMOGRAPHY | Facility: HOSPITAL | Age: 43
End: 2023-10-24

## 2023-10-24 NOTE — TELEPHONE ENCOUNTER
Follow up call Maxwell Chaves was referred to Dr Lauran Opitz . I sent a staff message to Dr Devante Sanchez' staff . She will call today to make an ppt re explained discordant and radial scar to her.

## 2023-10-24 NOTE — TELEPHONE ENCOUNTER
Reviewed chart and pt's request/ providers referral.    Called pt informed of Dr. Emma Holden and Dr Shahla Matson, pt would like to see Dr. Shahla Matson. Referral placed. Gabriela Odonnell,      This is the information for Dr Shahla Matson. Provider Address Phone   Sheryle Hick, MD [address] [facility] [address]  [address] [address] [phone]         Any concerns or additional questions, please call us at [phone]. I just routed a message from this pt. She would like to speak to you about her mammo results and need for biopsy. Thanks! 1        Mon 1:09 PM  KR  Pt wants the name of a breast surgeon. Is [facility] okay with you? Do you want me to place the referral? Please advise. Thanks! 4 mins  RD  Alanna Chavez, DO  yes Shahla Matson is great! don't we usually order the imaging with the boxes checked so the [facility] coordinates that?     3 mins  Miranda Ceja RN  Biopsy is done. She needs an excision. 2 mins  RD  Alanna Chavez, DO  Ah. Yes! [facility] here. Or Emma Holden at [place]. I think she is at [facility]   to [address] [place] [address] Ob Clinical Staff (supporting Kishan Pierre, RN)         10/24/23  9:36 AM  Jack Armas,     Do you have a breast surgeon that you can recommend at [facility] or [place] [address]?       Thanks & hope you are doing well,  Macario Odonnell

## 2023-10-27 ENCOUNTER — TELEPHONE (OUTPATIENT)
Dept: MAMMOGRAPHY | Facility: HOSPITAL | Age: 43
End: 2023-10-27

## 2023-10-27 NOTE — TELEPHONE ENCOUNTER
Follow up call regarding surgery appt with Dr Maycol Castelan. Brayden Altamirano verbalizes she is scheduled for Nov 11 to see Dr Maycol Castelan. Questions answered.

## 2023-11-10 ENCOUNTER — OFFICE VISIT (OUTPATIENT)
Dept: SURGERY | Facility: CLINIC | Age: 43
End: 2023-11-10
Payer: COMMERCIAL

## 2023-11-10 VITALS
RESPIRATION RATE: 16 BRPM | SYSTOLIC BLOOD PRESSURE: 147 MMHG | TEMPERATURE: 98 F | DIASTOLIC BLOOD PRESSURE: 83 MMHG | OXYGEN SATURATION: 98 % | BODY MASS INDEX: 25 KG/M2 | HEART RATE: 82 BPM | HEIGHT: 67.5 IN

## 2023-11-10 DIAGNOSIS — N64.89 RADIAL SCAR OF RIGHT BREAST: Primary | ICD-10-CM

## 2023-11-10 NOTE — PATIENT INSTRUCTIONS
Dr. Efraín Coelho  Tel: [phone]  Fax: [phone]  [address] [place], [place] [address]  [phone]     Surgery/Procedure: Right breast wire localized excisional biopsy     Anesthesia:   MAC  Surgery Length:   45 minutes CPT:  57855   Wire LOC:   Yes Nuc Med:   No   Samira Seed:  No       Dx & ICD-10: Radial scar of right breast (N64.89). Radiology Instructions: Right breast, 9:30 o'clock position, 5 cm from the nipple, vision shaped clip, biopsy demonstrates radial scar.     _______________________________________________________________________________    Someone must accompany you the day of the procedure to drive you home safely, because of anesthesia. You will need an adult  to stay with you the first night following your surgery. You must remove any kind of makeup, acrylic nails, lotions, powders, creams or deodorant. [place] ONLY: Pre-admission will give instruct you on when to take Gatorade and Tylenol/acetaminophen prior to your surgery, purchase 2 - 12oz bottles of regular Gatorade (NOT RED/SUGAR FREE). Otherwise, you may not eat or drink anything else after 11PM the night before surgery. [place] ONLY: You may not eat or drink anything after midnight the day of your surgery. Wear comfortable clothing that can be easily removed. If you wear dentures, contacts lenses, or any prosthesis, you will be asked to remove them. Do not drink alcohol or smoke 24 hours prior to your procedure. Bring a picture ID and your insurance card. Covid-19 testing is no longer required before surgery unless you are experiencing symptoms such as fever, cough, congestion, etc.   The Pre-Admission Testing Department will call the day before to confirm your procedure, give you the time you need to arrive by and directions on where to go. They begin making calls after 2pm, if you are not contacted by 4pm, please call the surgeon's office listed above.   Do not take any blood thinners at least one week prior to the procedure/surgery. This includes aspirin, baby aspirin, Ibuprofen products, herbal supplements, diet medications, vitamin E, fish oil and green tea supplements. Please check other supplements for these ingredients. *TYLENOL or acetaminophen is acceptable*  If you take Coumadin, Plavix, Xarelto, or Eliquis, please contact your prescribing physician for special instructions on how long to hold. If you take insulin contact your primary care physician for special instructions. Our surgery scheduler, Vinnie Vazquez, will be contacting you to discuss surgery dates. If you have any questions related to scheduling your surgery, please reach out to her at [phone].  _____________________________________________________________________  PRE-OPERATIVE TESTING IF INDICATED BELOW  PLEASE COMPLETE ASAP (AT LEAST 14-21 DAYS PRIOR TO SURGERY)  [] CBC [] BMP [] CMP [] EKG    [] PT, PTT, INR [] Cardiac Clearance  [] H&P Medical Clearance [] Chest X-ray     Please call Central Scheduling to schedule an appointment for pre-operative labs/tests @ [phone]  Does the patient have a pacemaker or ICD? Does the patient have sleep apnea?   [] Yes   [x] No                               [] Yes   [x] No

## 2023-11-13 ENCOUNTER — TELEPHONE (OUTPATIENT)
Dept: SURGERY | Facility: CLINIC | Age: 43
End: 2023-11-13

## 2023-11-13 DIAGNOSIS — N64.89 RADIAL SCAR OF RIGHT BREAST: Primary | ICD-10-CM

## 2023-11-13 NOTE — TELEPHONE ENCOUNTER
Calling pt in regards to scheduling surgery. Informed pt that I have 12/14/2023 available at [facility] with Dr. Dian Fonseca. Pt verbalized understanding and in agreement with date and location. All questions answered. Encouraged pt to call or [employer] message office with any other questions or concerns.

## 2023-11-17 ENCOUNTER — PATIENT MESSAGE (OUTPATIENT)
Dept: FAMILY MEDICINE CLINIC | Facility: CLINIC | Age: 43
End: 2023-11-17

## 2023-11-17 DIAGNOSIS — R63.5 ABNORMAL WEIGHT GAIN: ICD-10-CM

## 2023-11-17 DIAGNOSIS — R73.01 IMPAIRED FASTING GLUCOSE: Primary | ICD-10-CM

## 2023-11-19 NOTE — TELEPHONE ENCOUNTER
From: Delmer Titus  To: Anny Van  Sent: 11/17/2023 3:41 PM CST  Subject: Leptin test     Hi Caty,    Happy Thanksgiving to your family! I noticed that my leptin was not tested during my last appointment and it was on the elevated side when it was last tested in 2022. Can you please put in an order? Do I need an appointment for the lab? Also, do I need to fast prior to the lab work?      Thanks,  Macario Odonnell

## 2023-12-05 ENCOUNTER — TELEMEDICINE (OUTPATIENT)
Dept: FAMILY MEDICINE CLINIC | Facility: CLINIC | Age: 43
End: 2023-12-05
Payer: COMMERCIAL

## 2023-12-05 DIAGNOSIS — R09.81 NASAL CONGESTION: ICD-10-CM

## 2023-12-05 DIAGNOSIS — J32.9 SINUSITIS, UNSPECIFIED CHRONICITY, UNSPECIFIED LOCATION: Primary | ICD-10-CM

## 2023-12-05 DIAGNOSIS — J34.89 SINUS PRESSURE: ICD-10-CM

## 2023-12-05 PROCEDURE — 99213 OFFICE O/P EST LOW 20 MIN: CPT

## 2023-12-05 RX ORDER — AMOXICILLIN AND CLAVULANATE POTASSIUM 875; 125 MG/1; MG/1
1 TABLET, FILM COATED ORAL 2 TIMES DAILY
Qty: 14 TABLET | Refills: 0 | Status: SHIPPED | OUTPATIENT
Start: 2023-12-05 | End: 2023-12-11 | Stop reason: ALTCHOICE

## 2023-12-05 NOTE — PROGRESS NOTES
Virtual Virtual Check-In    Molly Barnett verbally consents to a Virtual Video Check-In service on 12/05/23. Patient understands and accepts financial responsibility for any deductible, co-insurance and/or co-pays associated with this service. This visit is conducted using Telemedicine with live, interactive video and audio. I spoke with Molly Barnett by secure video chat, verified date of birth, and discussed their current concerns:   Duration: 15    HPI  Molly Barnett presented via video visit for complaints of nasal congestion x 2 weeks. Associated sinus pressure, headache. Recent home COVID-negative. Denies cough, fever, chest pain, shortness of breath, nausea, vomiting, sore throat. Has tried over-the-counter's with minimal improvement. Review of Systems:   Review of Systems   Constitutional:  Positive for fatigue. Negative for activity change and fever. HENT:  Positive for congestion, sinus pressure and sinus pain. Negative for ear discharge, ear pain and sore throat. Respiratory:  Negative for cough, chest tightness and shortness of breath. Cardiovascular:  Negative for chest pain and palpitations. Neurological:  Positive for headaches. Psychiatric/Behavioral: Negative. All other systems reviewed and are negative. Reviewed Active Problems:  Patient Active Problem List    Diagnosis    Radial scar of right breast    Obesity (BMI 30-39. 9)    Depression    Endometriosis    Generalized anxiety disorder      Reviewed Past Medical History:  Past Medical History:   Diagnosis Date    Anxiety     Depression     PUPP (pruritic urticarial papules and plaques of pregnancy)       Reviewed Family History:  Family History   Problem Relation Age of Onset    No Known Problems Father     Thyroid disease Mother         Hyperthyroidism     No Known Problems Son     Thyroid disease Maternal Grandfather     Cancer Paternal Grandmother 80        Leukemia    No Known Problems Sister        Reviewed Social History:  Social History     Socioeconomic History    Marital status:    Tobacco Use    Smoking status: Never    Smokeless tobacco: Never   Vaping Use    Vaping Use: Never used   Substance and Sexual Activity    Alcohol use: Yes     Comment: minimal    Drug use: Never    Sexual activity: Yes     Partners: Male     Birth control/protection: Condom   Other Topics Concern    Caffeine Concern No    Exercise Yes    Seat Belt No    Special Diet No    Stress Concern No    Weight Concern Yes    Blood Transfusions No      Reviewed Current Medications:  Current Outpatient Medications   Medication Sig Dispense Refill    tretinoin 0.025 % External Cream       venlafaxine ER 37.5 MG Oral Capsule SR 24 Hr Take 1 capsule (37.5 mg total) by mouth daily. venlafaxine ER 75 MG Oral Capsule SR 24 Hr Take 1 capsule (75 mg total) by mouth daily. TAKE 1 CAPSULE BY MOUTH DAILY WITH FOOD. TAKE WITH THE 37.5MG CAPSULE FOR TOTAL DAILY DOSE .5MG      Multiple Vitamin (MULTI-DAY) Oral Tab Take by mouth every morning. Physical Exam  There were no vitals filed for this visit. Physical Exam  Vitals reviewed. Constitutional:       Appearance: Normal appearance. HENT:      Head: Normocephalic and atraumatic. Pulmonary:      Effort: Pulmonary effort is normal.   Neurological:      General: No focal deficit present. Mental Status: She is alert and oriented to person, place, and time. Psychiatric:         Mood and Affect: Mood normal.         Behavior: Behavior normal.            Diagnosis:  (J32.9) Sinusitis, unspecified chronicity, unspecified location  (primary encounter diagnosis)  (R09.81) Nasal congestion  (J34.89) Sinus pressure  Plan: amoxicillin clavulanate 875-125 MG Oral Tab  Patient complaining of nasal congestion, sinus pressure, headache x 2 weeks. Limited exam due to virtual.  No red flag symptoms. Home COVID-negative. HPI examination consistent with sinusitis.   Due to length of symptoms consistent with bacterial sinusitis. Prescription for Augmentin twice daily x 7 days to pharmacy. Advised to complete full course of antibiotics. Advised can add over-the-counter probiotic. Can continue Flonase for nasal congestion. Advised supportive care. Supportive care - humidifier at night, hot steam showers, lozenges, hot/cold beverages, LOTS of fluids, rest.        Follow-up as needed. Please note that the following visit was completed using two-way, real-time interactive audio and/or video communication. This has been done in good alec to provide continuity of care in the best interest of the provider-patient relationship, due to the ongoing public health crisis/national emergency and because of restrictions of visitation. There are limitations of this visit as no physical exam could be performed. Every conscious effort was taken to allow for sufficient and adequate time. This billing was spent on reviewing labs, medications, radiology tests and decision making. Appropriate medical decision-making and tests are ordered as detailed in the plan of care above. Patriciamulugeta Caitlin understands video evaluation is not a substitute for in person examination or emergency care. Patient advised to go to ER or call 911 for worsening symptoms or acute distress. This note was prepared using [employer] voice recognition dictation software. As a result errors may occur. When identified these errors have been corrected.  While every attempt is made to correct errors during dictation discrepancies may still exist.  SOPHY Mcnair

## 2023-12-14 ENCOUNTER — HOSPITAL ENCOUNTER (OUTPATIENT)
Dept: MAMMOGRAPHY | Facility: HOSPITAL | Age: 43
Discharge: HOME | End: 2023-12-14
Attending: SURGERY
Payer: COMMERCIAL

## 2023-12-14 ENCOUNTER — APPOINTMENT (OUTPATIENT)
Dept: MAMMOGRAPHY | Facility: HOSPITAL | Age: 43
End: 2023-12-14
Attending: SURGERY
Payer: COMMERCIAL

## 2023-12-14 ENCOUNTER — ANESTHESIA EVENT (OUTPATIENT)
Dept: SURGERY | Facility: HOSPITAL | Age: 43
End: 2023-12-14
Payer: COMMERCIAL

## 2023-12-14 ENCOUNTER — HOSPITAL ENCOUNTER (OUTPATIENT)
Facility: HOSPITAL | Age: 43
Setting detail: HOSPITAL OUTPATIENT SURGERY
Discharge: HOME | End: 2023-12-14
Attending: SURGERY | Admitting: SURGERY
Payer: COMMERCIAL

## 2023-12-14 ENCOUNTER — ANESTHESIA (OUTPATIENT)
Dept: SURGERY | Facility: HOSPITAL | Age: 43
End: 2023-12-14
Payer: COMMERCIAL

## 2023-12-14 VITALS
SYSTOLIC BLOOD PRESSURE: 131 MMHG | RESPIRATION RATE: 18 BRPM | DIASTOLIC BLOOD PRESSURE: 69 MMHG | HEART RATE: 82 BPM | HEIGHT: 67 IN | WEIGHT: 178 LBS | TEMPERATURE: 98 F | OXYGEN SATURATION: 98 % | BODY MASS INDEX: 27.94 KG/M2

## 2023-12-14 DIAGNOSIS — N64.89 RADIAL SCAR OF RIGHT BREAST: Primary | ICD-10-CM

## 2023-12-14 DIAGNOSIS — N64.89 RADIAL SCAR OF RIGHT BREAST: ICD-10-CM

## 2023-12-14 LAB — B-HCG UR QL: NEGATIVE

## 2023-12-14 PROCEDURE — 88342 IMHCHEM/IMCYTCHM 1ST ANTB: CPT | Performed by: SURGERY

## 2023-12-14 PROCEDURE — 76098 X-RAY EXAM SURGICAL SPECIMEN: CPT | Performed by: SURGERY

## 2023-12-14 PROCEDURE — 0HBT0ZZ EXCISION OF RIGHT BREAST, OPEN APPROACH: ICD-10-PCS | Performed by: SURGERY

## 2023-12-14 PROCEDURE — 19281 PERQ DEVICE BREAST 1ST IMAG: CPT | Performed by: SURGERY

## 2023-12-14 PROCEDURE — 81025 URINE PREGNANCY TEST: CPT

## 2023-12-14 PROCEDURE — 88307 TISSUE EXAM BY PATHOLOGIST: CPT | Performed by: SURGERY

## 2023-12-14 RX ORDER — SODIUM CHLORIDE, SODIUM LACTATE, POTASSIUM CHLORIDE, CALCIUM CHLORIDE 600; 310; 30; 20 MG/100ML; MG/100ML; MG/100ML; MG/100ML
INJECTION, SOLUTION INTRAVENOUS CONTINUOUS
Status: DISCONTINUED | OUTPATIENT
Start: 2023-12-14 | End: 2023-12-14

## 2023-12-14 RX ORDER — MIDAZOLAM HYDROCHLORIDE 1 MG/ML
INJECTION INTRAMUSCULAR; INTRAVENOUS AS NEEDED
Status: DISCONTINUED | OUTPATIENT
Start: 2023-12-14 | End: 2023-12-14 | Stop reason: SURG

## 2023-12-14 RX ORDER — HYDROMORPHONE HYDROCHLORIDE 1 MG/ML
0.4 INJECTION, SOLUTION INTRAMUSCULAR; INTRAVENOUS; SUBCUTANEOUS EVERY 5 MIN PRN
Status: DISCONTINUED | OUTPATIENT
Start: 2023-12-14 | End: 2023-12-14

## 2023-12-14 RX ORDER — ACETAMINOPHEN 500 MG
1000 TABLET ORAL ONCE
Status: DISCONTINUED | OUTPATIENT
Start: 2023-12-14 | End: 2023-12-14 | Stop reason: HOSPADM

## 2023-12-14 RX ORDER — MORPHINE SULFATE 4 MG/ML
2 INJECTION, SOLUTION INTRAMUSCULAR; INTRAVENOUS EVERY 10 MIN PRN
Status: DISCONTINUED | OUTPATIENT
Start: 2023-12-14 | End: 2023-12-14

## 2023-12-14 RX ORDER — PROCHLORPERAZINE EDISYLATE 5 MG/ML
5 INJECTION INTRAMUSCULAR; INTRAVENOUS EVERY 8 HOURS PRN
Status: DISCONTINUED | OUTPATIENT
Start: 2023-12-14 | End: 2023-12-14

## 2023-12-14 RX ORDER — MORPHINE SULFATE 4 MG/ML
4 INJECTION, SOLUTION INTRAMUSCULAR; INTRAVENOUS EVERY 10 MIN PRN
Status: DISCONTINUED | OUTPATIENT
Start: 2023-12-14 | End: 2023-12-14

## 2023-12-14 RX ORDER — CEFAZOLIN SODIUM/WATER 2 G/20 ML
2 SYRINGE (ML) INTRAVENOUS ONCE
Status: COMPLETED | OUTPATIENT
Start: 2023-12-14 | End: 2023-12-14

## 2023-12-14 RX ORDER — BUPIVACAINE HYDROCHLORIDE 5 MG/ML
INJECTION, SOLUTION EPIDURAL; INTRACAUDAL AS NEEDED
Status: DISCONTINUED | OUTPATIENT
Start: 2023-12-14 | End: 2023-12-14 | Stop reason: HOSPADM

## 2023-12-14 RX ORDER — HYDROCODONE BITARTRATE AND ACETAMINOPHEN 5; 325 MG/1; MG/1
1-2 TABLET ORAL EVERY 6 HOURS PRN
Qty: 20 TABLET | Refills: 0 | Status: SHIPPED | OUTPATIENT
Start: 2023-12-14

## 2023-12-14 RX ORDER — HYDROMORPHONE HYDROCHLORIDE 1 MG/ML
0.2 INJECTION, SOLUTION INTRAMUSCULAR; INTRAVENOUS; SUBCUTANEOUS EVERY 5 MIN PRN
Status: DISCONTINUED | OUTPATIENT
Start: 2023-12-14 | End: 2023-12-14

## 2023-12-14 RX ORDER — LIDOCAINE HYDROCHLORIDE AND EPINEPHRINE 10; 10 MG/ML; UG/ML
INJECTION, SOLUTION INFILTRATION; PERINEURAL AS NEEDED
Status: DISCONTINUED | OUTPATIENT
Start: 2023-12-14 | End: 2023-12-14 | Stop reason: HOSPADM

## 2023-12-14 RX ORDER — HYDROMORPHONE HYDROCHLORIDE 1 MG/ML
0.6 INJECTION, SOLUTION INTRAMUSCULAR; INTRAVENOUS; SUBCUTANEOUS EVERY 5 MIN PRN
Status: DISCONTINUED | OUTPATIENT
Start: 2023-12-14 | End: 2023-12-14

## 2023-12-14 RX ORDER — MORPHINE SULFATE 10 MG/ML
6 INJECTION, SOLUTION INTRAMUSCULAR; INTRAVENOUS EVERY 10 MIN PRN
Status: DISCONTINUED | OUTPATIENT
Start: 2023-12-14 | End: 2023-12-14

## 2023-12-14 RX ORDER — NALOXONE HYDROCHLORIDE 0.4 MG/ML
0.08 INJECTION, SOLUTION INTRAMUSCULAR; INTRAVENOUS; SUBCUTANEOUS AS NEEDED
Status: DISCONTINUED | OUTPATIENT
Start: 2023-12-14 | End: 2023-12-14

## 2023-12-14 RX ORDER — ONDANSETRON 2 MG/ML
4 INJECTION INTRAMUSCULAR; INTRAVENOUS EVERY 6 HOURS PRN
Status: DISCONTINUED | OUTPATIENT
Start: 2023-12-14 | End: 2023-12-14

## 2023-12-14 RX ORDER — LIDOCAINE HYDROCHLORIDE 10 MG/ML
INJECTION, SOLUTION EPIDURAL; INFILTRATION; INTRACAUDAL; PERINEURAL AS NEEDED
Status: DISCONTINUED | OUTPATIENT
Start: 2023-12-14 | End: 2023-12-14 | Stop reason: SURG

## 2023-12-14 RX ORDER — GLYCOPYRROLATE 0.2 MG/ML
INJECTION, SOLUTION INTRAMUSCULAR; INTRAVENOUS AS NEEDED
Status: DISCONTINUED | OUTPATIENT
Start: 2023-12-14 | End: 2023-12-14 | Stop reason: SURG

## 2023-12-14 RX ADMIN — GLYCOPYRROLATE 0.2 MG: 0.2 INJECTION, SOLUTION INTRAMUSCULAR; INTRAVENOUS at 11:47:00

## 2023-12-14 RX ADMIN — LIDOCAINE HYDROCHLORIDE 30 MG: 10 INJECTION, SOLUTION EPIDURAL; INFILTRATION; INTRACAUDAL; PERINEURAL at 11:47:00

## 2023-12-14 RX ADMIN — MIDAZOLAM HYDROCHLORIDE 2 MG: 1 INJECTION INTRAMUSCULAR; INTRAVENOUS at 11:41:00

## 2023-12-14 RX ADMIN — SODIUM CHLORIDE, SODIUM LACTATE, POTASSIUM CHLORIDE, CALCIUM CHLORIDE: 600; 310; 30; 20 INJECTION, SOLUTION INTRAVENOUS at 12:15:00

## 2023-12-14 RX ADMIN — SODIUM CHLORIDE, SODIUM LACTATE, POTASSIUM CHLORIDE, CALCIUM CHLORIDE: 600; 310; 30; 20 INJECTION, SOLUTION INTRAVENOUS at 11:57:00

## 2023-12-14 RX ADMIN — CEFAZOLIN SODIUM/WATER 2 G: 2 G/20 ML SYRINGE (ML) INTRAVENOUS at 11:49:00

## 2023-12-14 NOTE — PROCEDURES
[facility]  Procedure Note    Pheobe Actis Patient Status:  Outpatient    10/9/1980 MRN [id number]   [facility] [address] [facility] Constance Barclay MD   Hosp Day # 0 PCP Duncan Ingram DO     Procedure: right breast localization    Pre-Procedure Diagnosis:  right radial scar    Post-Procedure Diagnosis: same    Anesthesia:  Local    Findings:  as above    Specimens: 0    Blood Loss:  0    Tourniquet Time: 0  Complications:  None  Drains:  0      Secondary Diagnosis:  none    Pritesh French MD  2023

## 2023-12-14 NOTE — IMAGING NOTE
0758  Pt  to mammography department in wheelchair, transporter escort      0800 Hx taken, procedure explained, questions answered. IV patent, no redness or swelling noted at site    0802 Consent signed and verified     0806  [facility] CHILDREN here, order verified and signed by all procedural staff members    3098 Time out taken; site marked RIGHT breast    0808 scouts completed by Lisha Martins, mammography technologist     2956 Chloro prep as skin prep to site. Lidocaine 1% 10 milligrams per ml given as anesthetic affect from kit  3 ml total given. 7534 Ghiatas needle 20 gauge  X 5 cm placed. Pt re-imaged, pt had moved, site affected, will restart    0816 Chloro prep as skin prep to site. Lidocaine 1% 10 milligrams per ml given as anesthetic affect from kit  3 ml total given. 0818 Ghiatas needle  20 gauge  X 5 cm placed. Pt re-imaged, procedure complete at 0822.     0822 BB marker to site wire secured to breast strips. A 4x4 dsg secured over wire with tape by RADHA Maguire RN    IV patent, no redness or swelling noted at site     7071 Transporter here to return pt to her room

## 2023-12-14 NOTE — BRIEF OP NOTE
Pre-Operative Diagnosis: Radial scar of right breast [N64.89]     Post-Operative Diagnosis: Radial scar of right breast [N64.89]      Procedure Performed:   Right breast wire localized excisional biopsy    Surgeon(s) and Role:     Renetta Stauffer MD - Primary    Assistant(s):  Surgical Assistant.: Rc Riggs CSA     Surgical Findings: Clip visualized on specimen radiogram     Specimen: Right breast lumpectomy     Estimated Blood Loss: 5cc    Flora Lopez MD  12/14/2023  12:31 PM

## 2023-12-15 ENCOUNTER — TELEPHONE (OUTPATIENT)
Facility: CLINIC | Age: 43
End: 2023-12-15

## 2023-12-15 LAB — AMB EXT COVID-19 RESULT: DETECTED

## 2023-12-15 NOTE — OPERATIVE REPORT
[facility]    PATIENT'S NAME: Early Lard   ATTENDING PHYSICIAN: Lyle Sanchez. Maricruz Granado MD   OPERATING PHYSICIAN: Lyle Sanchez. Maricruz Granado MD   PATIENT ACCOUNT#:   [id number]    LOCATION:  [address]  MEDICAL RECORD #:   [id number]       YOB: 1980  ADMISSION DATE:       12/14/2023      OPERATION DATE:  12/14/2023    OPERATIVE REPORT    PREOPERATIVE DIAGNOSIS:  Radial scar of right breast.  POSTOPERATIVE DIAGNOSIS:  Radial scar of right breast.  PROCEDURE:  Right breast wire-localized lumpectomy with right breast specimen radiography. ANESTHESIA:  Monitored anesthesia care and local.    ESTIMATED BLOOD LOSS:  20 mL. DRAINS:  None. COMPLICATIONS:  None. DISPOSITION:  Stable on transfer to recovery room. INDICATIONS:  The patient is a 67-year-old female with an imaging-detected concern in the right breast.  She was found to have a distortion, and biopsy confirmed radial scar. We discussed possible association with atypia, and I recommended a formal surgical excision for this problem. The risks and possible complications were discussed with the patient including, but not limited to, infection, bleeding, injury to surrounding structures, and possible need for reoperation. She agreed to the proposed surgery. OPERATIVE TECHNIQUE:  Patient brought to the imaging suite. She underwent a wire localization of the area of concern in the right breast.  She was then brought to the OR, placed in supine position, properly padded and secured, given a dose of IV antibiotics, and sequential compression devices were applied to the legs for DVT prophylaxis. Monitored anesthesia care was induced. The right breast was then prepped and draped in usual sterile fashion. Lidocaine 1% with epinephrine was used to infiltrate the skin and subcutaneous tissues of the targeted incision site.   A curvilinear incision was made along the superolateral areolar border with a 15-blade the knife in the skin. The wire was identified and brought into the field. Using sharp dissection and electrocautery, a segment of breast tissue surrounding the tip of the wire was carefully excised and oriented with a short stitch and single clip superiorly, long stitch and double clip laterally, in order to allow for appropriate pathological margin assessment and review. It was then placed in the imaging device, where specimen x-ray confirmed the presence of the targeted clip, residual area with adequate margins as deemed by myself, and a clip was placed back within the cavity to assist with subsequent surveillance. The wound was irrigated. Hemostasis was assured with electrocautery. Closure was accomplished with interrupted 3-0 Vicryl for deep layer, running 4-0 subcuticular Monocryl for skin. Mastisol and Steri-Strips were applied, and 0.5% Marcaine was instilled in the cavity to assist with postoperative analgesia. A sterile dressing and compression bra were placed. Blood loss was minimal.  All counts were correct at the conclusion of procedure. She tolerated the procedure well. She was transferred to the recovery area in stable condition. Dictated By Obdulia Grissom. Jono Horowitz MD  d: 12/14/2023 12:48:51  t: 12/14/2023 14:04:00  Job [id number]  [facility]/    cc: DO Harshal De Los Santos.  KASSANDRA Gonzalez

## 2023-12-15 NOTE — TELEPHONE ENCOUNTER
Patient notified with understanding. She will continue to monitor and keep the clinic apprised of her progress.

## 2023-12-15 NOTE — TELEPHONE ENCOUNTER
Patient had a  video appointment with KASSANDRA Barkley, on 12/5/23 due to sinusitis and antibiotic prescribed . Per patient, s/p lumpectomy yesterday 12/14/23. Sinus symptoms was fine for a week after antibiotic completion but son currently has bacterial  pneumonia. Her current symptoms x 2-3 days now===dry cough, nasal congestion,post nasal drip, getting worse and worse at night, no fever . Patient sounds nasally congested . Home care provided=monitor surgical incision site, check for home COVID, can try over the counter nasal spray like Flonase or saline spray, tylenol for discomfort, allergy medication, avoid driving or operate machinery, push fluids and increase vit c. Advised urgent care or immediate care  for worsening symptoms. Contact surgeon for any surgical incision site signs of infections.        Please reply to pool: WINNIE Payne

## 2023-12-15 NOTE — TELEPHONE ENCOUNTER
Symptoms sound more likely viral and would definitely check for Covid. Agree with home care and recommendations.

## 2023-12-18 ENCOUNTER — PATIENT MESSAGE (OUTPATIENT)
Facility: CLINIC | Age: 43
End: 2023-12-18

## 2023-12-19 RX ORDER — FAMOTIDINE 20 MG/1
20 TABLET, FILM COATED ORAL 2 TIMES DAILY
Qty: 180 TABLET | Refills: 1 | Status: SHIPPED | OUTPATIENT
Start: 2023-12-19

## 2023-12-19 NOTE — TELEPHONE ENCOUNTER
From: Luis Navarrete  To: Wright Aschoff  Sent: 12/18/2023 4:34 PM CST  Subject: Refill     Hi Dr. Rhonda Padron,    Happy holidays! I am still having heartburn from time to time. May I have a refill on my medication please?      Thanks,  Fransico Reeves

## 2023-12-26 ENCOUNTER — TELEPHONE (OUTPATIENT)
Facility: CLINIC | Age: 43
End: 2023-12-26

## 2023-12-26 NOTE — TELEPHONE ENCOUNTER
Patient called (identified name and ),   Gave Dr Jazmyn Hernandez response. She stated understanding.

## 2023-12-26 NOTE — TELEPHONE ENCOUNTER
I agree with recommendations and CDC guidance on quarantine. There is no need to mask anymore if outside the 10 day quarantine window as some stay positive on Covid testing for several months.

## 2023-12-26 NOTE — TELEPHONE ENCOUNTER
Condition update:  Patient tested positive for Covid on 12/15.2023. Was not treated with Paxlovid. Has improved but till has some nasal congestion and taking a decongestant. Denies severe sinus pain, shortness of breath, fever, dark mucous or ear pain. Is concerned because she keeps testing positive. Not sure if she needs to continue wearing mask around two sons and . Advised, per CDC, she can test positive for up to 90 days. Discussed CDC recommends masking for up to 10 days, then can go about activities without mask. She is asking if Dr Naila Clements has any recommendations? Please advise?

## 2023-12-27 ENCOUNTER — NURSE ONLY (OUTPATIENT)
Dept: SURGERY | Facility: CLINIC | Age: 43
End: 2023-12-27
Payer: COMMERCIAL

## 2023-12-27 VITALS
OXYGEN SATURATION: 98 % | TEMPERATURE: 98 F | DIASTOLIC BLOOD PRESSURE: 76 MMHG | SYSTOLIC BLOOD PRESSURE: 119 MMHG | RESPIRATION RATE: 15 BRPM | HEART RATE: 95 BPM

## 2023-12-27 PROCEDURE — 99024 POSTOP FOLLOW-UP VISIT: CPT | Performed by: SURGERY

## 2023-12-27 PROCEDURE — 3078F DIAST BP <80 MM HG: CPT | Performed by: SURGERY

## 2023-12-27 PROCEDURE — 3074F SYST BP LT 130 MM HG: CPT | Performed by: SURGERY

## 2023-12-27 NOTE — PROGRESS NOTES
Patient presents today for a post-op nurse visit for surgical wound check. Pt had a Right breast wire localized excisional biopsy on 12/14/2023. Pt has been healing well since surgery. Pain has been tolerable and managed with tylenol. Pt denies any signs or symptoms of infection, including fever, chills, redness at surgical site, increase swelling, surrounding skin that is hot to touch, and abnormal drainage from the site. Steri strips are still intact and in place. Edges peeling, steri stripes gently removed, area cleansed, and neosporin applied. No bruising present on right breast.  No problems at the surgical site. Surrounding skin is c/d/i. Informed pt when she should call the office regarding concerns with the surgical incision site. Pathology results were not shared with pt today  Pt is aware that further details regarding pathology results will be discussed with Dr. Angela Bautista at the next post-op appt. Informed pt that her next follow-up will be with Dr. Angela Bautista on 1/17/2024. Pt verbalized understanding. All questions were answered. Encouraged to call or MyChart message with any other questions or concerns.

## 2024-01-02 ENCOUNTER — PATIENT MESSAGE (OUTPATIENT)
Facility: CLINIC | Age: 44
End: 2024-01-02

## 2024-01-02 ENCOUNTER — HOSPITAL ENCOUNTER (OUTPATIENT)
Age: 44
Discharge: HOME | End: 2024-01-02
Payer: COMMERCIAL

## 2024-01-02 ENCOUNTER — APPOINTMENT (OUTPATIENT)
Dept: GENERAL RADIOLOGY | Age: 44
End: 2024-01-02
Attending: NURSE PRACTITIONER
Payer: COMMERCIAL

## 2024-01-02 VITALS
RESPIRATION RATE: 18 BRPM | OXYGEN SATURATION: 98 % | DIASTOLIC BLOOD PRESSURE: 93 MMHG | TEMPERATURE: 99 F | HEART RATE: 101 BPM | SYSTOLIC BLOOD PRESSURE: 134 MMHG

## 2024-01-02 DIAGNOSIS — J18.9 PNEUMONIA OF RIGHT LOWER LOBE DUE TO INFECTIOUS ORGANISM: Primary | ICD-10-CM

## 2024-01-02 DIAGNOSIS — R05.1 ACUTE COUGH: ICD-10-CM

## 2024-01-02 PROCEDURE — 71046 X-RAY EXAM CHEST 2 VIEWS: CPT | Performed by: NURSE PRACTITIONER

## 2024-01-02 PROCEDURE — 99214 OFFICE O/P EST MOD 30 MIN: CPT | Performed by: NURSE PRACTITIONER

## 2024-01-02 RX ORDER — AMOXICILLIN 500 MG/1
1000 TABLET, FILM COATED ORAL 3 TIMES DAILY
Qty: 42 TABLET | Refills: 0 | Status: SHIPPED | OUTPATIENT
Start: 2024-01-02 | End: 2024-01-09

## 2024-01-02 RX ORDER — ALBUTEROL SULFATE 90 UG/1
2 AEROSOL, METERED RESPIRATORY (INHALATION) EVERY 4 HOURS PRN
Qty: 1 EACH | Refills: 0 | Status: SHIPPED | OUTPATIENT
Start: 2024-01-02 | End: 2024-02-01

## 2024-01-02 RX ORDER — BENZONATATE 200 MG/1
200 CAPSULE ORAL 3 TIMES DAILY PRN
Qty: 15 CAPSULE | Refills: 0 | Status: SHIPPED | OUTPATIENT
Start: 2024-01-02

## 2024-01-02 NOTE — ED PROVIDER NOTES
Patient Seen in: [facility]      History     Chief Complaint   Patient presents with    Cough     Post COVID symptoms. - Entered by patient     Stated Complaint: Cough - Post COVID symptoms.    Subjective:   HPI  Patient is a 43-year-old female that presents to the immediate care center with concern for cough and congestion worsening for the last couple weeks.  She tested positive for COVID several weeks ago.  She felt she was getting better from that until her cough and congestion returned.  The last couple of days she has had general malaise and fatigue that was not present before.  Her son recently has been treated for diagnosed pneumonia.  Patient has been eating and drinking without difficulty; having no chest pain or shortness of air.          Objective:   Past Medical History:   Diagnosis Date    Anxiety     Depression     IBS (irritable bowel syndrome)     Pneumonia due to organism 2018    Bacterial    PUPP (pruritic urticarial papules and plaques of pregnancy)     Visual impairment     Reading glasses              Past Surgical History:   Procedure Laterality Date          2018    CYST ASPIRATION LEFT      Done at [facility] in [place]    ENDOMETRIAL ABLATION  2022    [facility]  SECTION LEVEL I      AIDA LOCALIZATION WIRE 1 SITE RIGHT (CPT=19281)  2023    NEEDLE BIOPSY RIGHT Right 10/19/2023    US bx      2018, 2013    WISDOM TEETH REMOVED                  Social History     Socioeconomic History    Marital status:    Tobacco Use    Smoking status: Never    Smokeless tobacco: Never   Vaping Use    Vaping Use: Never used   Substance and Sexual Activity    Alcohol use: Yes     Comment: minimal    Drug use: Never    Sexual activity: Yes     Partners: Male     Birth control/protection: Condom   Other Topics Concern    Caffeine Concern No    Exercise Yes    Seat Belt No    Special Diet No    Stress Concern No    Weight Concern Yes    Blood  Transfusions No              Review of Systems   Constitutional:  Positive for chills and fatigue.   HENT:  Positive for congestion.    Respiratory:  Positive for cough and shortness of breath.    Cardiovascular:  Negative for chest pain.   Gastrointestinal:  Negative for abdominal pain.   Skin:  Negative for rash.   Neurological:  Positive for weakness. Negative for dizziness.       Positive for stated complaint: Cough - Post COVID symptoms.  Other systems are as noted in HPI.  Constitutional and vital signs reviewed.      All other systems reviewed and negative except as noted above.    Physical Exam     ED Triage Vitals [01/02/24 1116]   BP (!) 134/93   Pulse 101   Resp 18   Temp 98.9 °F (37.2 °C)   Temp src Temporal   SpO2 98 %   O2 Device None (Room air)       Current:BP (!) 134/93   Pulse 101   Temp 98.9 °F (37.2 °C) (Temporal)   Resp 18   LMP 12/07/2023 (Exact Date)   SpO2 98%         Physical Exam  Vitals and nursing note reviewed.   Constitutional:       General: She is not in acute distress.     Appearance: She is not ill-appearing.   HENT:      Nose: Nose normal.   Eyes:      Conjunctiva/sclera: Conjunctivae normal.   Pulmonary:      Effort: Pulmonary effort is normal. No respiratory distress.      Breath sounds: Normal breath sounds.   Musculoskeletal:      Cervical back: Normal range of motion and neck supple.   Skin:     General: Skin is warm and dry.      Findings: No rash.   Neurological:      Mental Status: She is alert and oriented to person, place, and time.   Psychiatric:         Behavior: Behavior normal.               ED Course   Labs Reviewed - No data to display  XR CHEST PA + LAT CHEST (CPT=71046)   Final Result   PROCEDURE: XR CHEST PA + LAT CHEST (CPT=71046)       COMPARISON: [facility] in [place], XR RIBS, UNILATERAL (2    VIEWS), RIGHT (CPT=71100), 2/17/2023, 1:01 PM.       INDICATIONS: Cough for 2 weeks.       TECHNIQUE:   Two views.         FINDINGS:     CARDIAC/VASC: Heart size and pulmonary vascularity normal.   MEDIAST/ELI: No visible mass or adenopathy.    LUNGS/PLEURA: Anterobasal consolidation in the right lower lobe.  Right    greater than left apicalpleural thickening.  Azygos fissure.   BONES: No fracture or visible bony lesion.    OTHER: Postsurgical changes right breast.                   =====   CONCLUSION:    1. Right lower lobe pneumonia.  Follow-up to complete radiographic    resolution recommended.   2. 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               Dictated by (CST): Eilot Tavarez MD on 1/02/2024 at 12:44 PM        Finalized by (CST): Eliot Tavarez MD on 1/02/2024 at 12:46 PM                                  MDM      Radiographic findings reviewed with patient at bedside prior to disposition.  She was informed we will start treating her outpatient today with antibiotics, antitussive medication, and albuterol inhaler.  She was informed that she should contact her primary care provider to schedule appointment to be seen next week to ensure resolution of her underlying pneumonia.  She was also informed that if at any time her symptoms worsen: She have chest pain, shortness of breath, worsening fatigue, lethargy, or if for any reason she is unable to take her antibiotics she must go immediately to the emergency department for further evaluation and treatment.  She was informed that it is reasonable to start treating this pneumonia outpatient, but that at times patients do require hospitalization for IV medication.  She states understanding agrees with plan.                                 Medical Decision Making  Differential diagnoses considered included, but are not exclusive of: bacterial vs viral sinusitis, dehydration, pneumonia, influenza, Covid-19 infection, and other viral upper respiratory infection.       Problems Addressed:  Pneumonia of right lower lobe due to infectious organism:  undiagnosed new problem with uncertain prognosis    Risk  OTC drugs.  Prescription drug management.        Disposition and Plan     Clinical Impression:  1. Pneumonia of right lower lobe due to infectious organism    2. Acute cough         Disposition:  Discharge  1/2/2024 12:58 pm    Follow-up:  Nazia Velasco DO  [address] [address]  [phone]    Schedule an appointment as soon as possible for a visit in 1 week            Medications Prescribed:  Discharge Medication List as of 1/2/2024  1:01 PM        START taking these medications    Details   benzonatate 200 MG Oral Cap Take 1 capsule (200 mg total) by mouth 3 (three) times daily as needed for cough., Normal, Disp-15 capsule, R-0      albuterol 108 (90 Base) MCG/ACT Inhalation Aero Soln Inhale 2 puffs into the lungs every 4 (four) hours as needed for Wheezing., Normal, Disp-1 each, R-0      amoxicillin 500 MG Oral Tab Take 2 tablets (1,000 mg total) by mouth 3 (three) times daily for 7 days., Normal, Disp-42 tablet, R-0

## 2024-01-02 NOTE — ED INITIAL ASSESSMENT (HPI)
Pt had covid mid December and has had lingering cough that has not been improving; denies fever; son had pneumonia toward end of December

## 2024-01-03 NOTE — TELEPHONE ENCOUNTER
Spoke with patient, she will continue medications as prescribed and let us know if worsening or persistent symptoms.     Scheduled [facility] follow up for pneumonia:     Future Appointments   Date Time Provider Department Center   1/11/2024  1:30 PM Zehra Way MD [facility] FP [facility] OP   1/17/2024 12:45 PM Ester Fisher MD [facility]

## 2024-01-11 ENCOUNTER — OFFICE VISIT (OUTPATIENT)
Facility: CLINIC | Age: 44
End: 2024-01-11
Payer: COMMERCIAL

## 2024-01-11 ENCOUNTER — HOSPITAL ENCOUNTER (OUTPATIENT)
Dept: GENERAL RADIOLOGY | Age: 44
Discharge: HOME | End: 2024-01-11
Attending: FAMILY MEDICINE
Payer: COMMERCIAL

## 2024-01-11 VITALS — HEART RATE: 80 BPM | SYSTOLIC BLOOD PRESSURE: 120 MMHG | OXYGEN SATURATION: 97 % | DIASTOLIC BLOOD PRESSURE: 66 MMHG

## 2024-01-11 DIAGNOSIS — J18.9 PNEUMONIA OF RIGHT LOWER LOBE DUE TO INFECTIOUS ORGANISM: Primary | ICD-10-CM

## 2024-01-11 DIAGNOSIS — J18.9 PNEUMONIA OF RIGHT LOWER LOBE DUE TO INFECTIOUS ORGANISM: ICD-10-CM

## 2024-01-11 PROCEDURE — 3074F SYST BP LT 130 MM HG: CPT | Performed by: FAMILY MEDICINE

## 2024-01-11 PROCEDURE — 71046 X-RAY EXAM CHEST 2 VIEWS: CPT | Performed by: FAMILY MEDICINE

## 2024-01-11 PROCEDURE — 3078F DIAST BP <80 MM HG: CPT | Performed by: FAMILY MEDICINE

## 2024-01-11 PROCEDURE — 99213 OFFICE O/P EST LOW 20 MIN: CPT | Performed by: FAMILY MEDICINE

## 2024-01-11 RX ORDER — HYDROXYZINE HYDROCHLORIDE 25 MG/1
25 TABLET, FILM COATED ORAL AS NEEDED
COMMUNITY
Start: 2023-12-31

## 2024-01-11 NOTE — PROGRESS NOTES
Subjective:   Lyudmila Epstein is a 43 year old female who presents for Urgent Care F/u (Patient comes to the office to follow from urgent care. Patient went to urgent care on 1/2/24 due to having pneumonia.  )     Patient presents for follow up pneumonia diagnosis on 1/2/24. Seen in urgent care. Started on amoxicillin. Patient states feeling much better. Took last dose last night.     History/Other:    Chief Complaint Reviewed and Verified  Nursing Notes Reviewed and   Verified  Tobacco Reviewed  Allergies Reviewed  Medications Reviewed    Medical History Reviewed  Surgical History Reviewed  Family History   Reviewed  Social History Reviewed         Tobacco:  She has never smoked tobacco.    Current Outpatient Medications   Medication Sig Dispense Refill    hydrOXYzine 25 MG Oral Tab Take 1 tablet (25 mg total) by mouth as needed for Anxiety.      albuterol 108 (90 Base) MCG/ACT Inhalation Aero Soln Inhale 2 puffs into the lungs every 4 (four) hours as needed for Wheezing. 1 each 0    famotidine (PEPCID) 20 MG Oral Tab Take 1 tablet (20 mg total) by mouth 2 (two) times daily. 180 tablet 1    tretinoin 0.025 % External Cream       venlafaxine ER 75 MG Oral Capsule SR 24 Hr Take 2 capsules (150 mg total) by mouth daily. TAKE 1 CAPSULE BY MOUTH DAILY WITH FOOD. TAKE WITH THE 37.5MG CAPSULE FOR TOTAL DAILY DOSE .5MG      Multiple Vitamin (MULTI-DAY) Oral Tab Take by mouth every morning.           Review of Systems:  Review of Systems   Constitutional: Negative.    Respiratory:  Positive for cough (improving).    Cardiovascular: Negative.    Gastrointestinal: Negative.    Skin: Negative.    Neurological: Negative.        Objective:   /66 (BP Location: Left arm, Patient Position: Sitting, Cuff Size: adult)   Pulse 80   LMP 01/07/2024 (Exact Date)   SpO2 97%  Estimated body mass index is 27.88 kg/m² as calculated from the following:    Height as of 12/14/23: 5' 7\" (1.702 m).    Weight as of 12/14/23:  178 lb (80.7 kg).  Physical Exam  Vitals and nursing note reviewed.   Constitutional:       General: She is not in acute distress.     Appearance: Normal appearance.   HENT:      Head: Normocephalic and atraumatic.   Eyes:      General:         Right eye: No discharge.         Left eye: No discharge.      Comments: Extraocular eye movements grossly intact   Pulmonary:      Effort: Pulmonary effort is normal. No respiratory distress.      Breath sounds: Normal breath sounds. No stridor. No wheezing, rhonchi or rales.   Musculoskeletal:      Comments: Normal gait   Skin:     Findings: No rash.   Neurological:      General: No focal deficit present.      Mental Status: She is alert. Mental status is at baseline.   Psychiatric:         Mood and Affect: Mood normal.         Behavior: Behavior normal.       Assessment & Plan:   1. Pneumonia of right lower lobe due to infectious organism (Primary)  -     XR CHEST PA + LAT CHEST (CPT=71046); Future; Expected date: 01/11/2024  -ordered CXR as recommended by radiology  -improving as expected. The patient educated on disease process when to seek emergency care versus PCP follow up.         Return if symptoms worsen or fail to improve.    Zehra Way MD, 1/11/2024, 1:54 PM

## 2024-01-17 ENCOUNTER — OFFICE VISIT (OUTPATIENT)
Dept: SURGERY | Facility: CLINIC | Age: 44
End: 2024-01-17
Payer: COMMERCIAL

## 2024-01-17 VITALS
HEART RATE: 93 BPM | DIASTOLIC BLOOD PRESSURE: 86 MMHG | RESPIRATION RATE: 16 BRPM | SYSTOLIC BLOOD PRESSURE: 139 MMHG | OXYGEN SATURATION: 97 % | TEMPERATURE: 98 F

## 2024-01-17 DIAGNOSIS — N64.89 RADIAL SCAR OF RIGHT BREAST: Primary | ICD-10-CM

## 2024-01-17 PROCEDURE — 3079F DIAST BP 80-89 MM HG: CPT | Performed by: SURGERY

## 2024-01-17 PROCEDURE — 3075F SYST BP GE 130 - 139MM HG: CPT | Performed by: SURGERY

## 2024-01-17 PROCEDURE — 99024 POSTOP FOLLOW-UP VISIT: CPT | Performed by: SURGERY

## 2024-01-17 NOTE — PROGRESS NOTES
Breast Surgery Post-Operative Visit    Diagnosis: Radial scar, right breast, s/p excisional biopsy on 2023    Stage: N/A    Disease Status:  Surgical treatment complete, no further treatment pending.    History of Present Illness:   Ms. Lyudmila Epstein is a 43 year old woman who presents with an imaging detected concern of the right breast.  The patient denies any palpable masses, nipple discharge, skin changes or axillary symptoms.  She has no personal prior history of breast disease or biopsies.  She does not have any known family history of breast cancer.  She presented for screening mammogram on 2023 and was found to have distortion in the right breast which additional imaging was recommended.  This took place on 2023 and confirmed a ill-defined hypoechoic area measuring up to 1.7 cm in this location and biopsy was recommended.  This took place on 2023 confirming portions of a radial scar with benign breast tissue. She underwent excisional biopsy, which occurred without complication. She is here for postoperative visit. She reports her pain is under control. She denies erythema, warmth, drainage, or fevers. She is here today for evaluation and recommendations for further therapy.        Past Medical History:   Diagnosis Date    Anxiety     Depression     IBS (irritable bowel syndrome)     Pneumonia due to organism 2018    Bacterial    PUPP (pruritic urticarial papules and plaques of pregnancy)     Visual impairment     Reading glasses       Past Surgical History:   Procedure Laterality Date          2018    CYST ASPIRATION LEFT      Done at [facility] in [place]    ENDOMETRIAL ABLATION  2022    [facility]  SECTION LEVEL I      AIDA LOCALIZATION WIRE 1 SITE RIGHT (CPT=19281)  2023    NEEDLE BIOPSY RIGHT Right 10/19/2023    US bx      2018, 2013    WISDOM TEETH REMOVED         Gynecological History:  Pt is a   Pt was 32 years old at  time of first pregnancy.    She has cumulative breastfeeding history of 20 months, last unknown.  She achieved menarche at age 15 and LMP 11/7/2023  She denies any history of hormone replacement therapy.  She has history of oral contraceptive use for 2-4 years, last 2 years ago.  She denies infertility treatment to achieve pregnancy.    Medications:    No outpatient medications have been marked as taking for the 1/17/24 encounter (Appointment) with Ester Fisher MD.       Allergies:    No Known Allergies      Family History:   Family History   Problem Relation Age of Onset    No Known Problems Father     Thyroid disease Mother         Hyperthyroidism     Depression Mother     No Known Problems Son     Heart Disorder Maternal Grandfather     Thyroid disease Maternal Grandfather     Cancer Paternal Grandmother 88        Leukemia    No Known Problems Sister        She is not of Ashkenazi Rastafari ancestry.    Social History:  History   Alcohol Use    Yes     Comment: minimal       History   Smoking Status    Never   Smokeless Tobacco    Never     Ms. Lyudmila Epstein is  with 2 children. She has 1 siblings. She is currently Homemaker      Review of Systems:  General:   The patient denies, fever, chills, night sweats, fatigue, generalized weakness, change in appetite or weight loss.    HEENT:     The patient denies eye irritation, cataracts, redness, glaucoma, yellowing of the eyes, change in vision or color blindness. The patient denies hearing loss, ringing in the ears, ear drainage, earaches, nasal congestion, nose bleeds, snoring, pain in mouth/throat, hoarseness, change in voice, facial trauma. +glasses    Respiratory:  The patient denies chronic cough, phlegm, hemoptysis, pleurisy/chest pain, pneumonia, asthma, wheezing, difficulty in breathing with exertion, emphysema, chronic bronchitis, shortness of breath or abnormal sound when breathing.     Cardiovascular:  There is no history of chest pain, chest  pressure/discomfort, palpitations, irregular heartbeat, fainting or near-fainting, difficulty breathing when lying flat, SOB/Coughing at night, swelling of the legs or chest pain while walking.    Breasts:  See history of present illness    Gastrointestinal:     There is no history of difficulty or pain with swallowing, reflux symptoms, vomiting, dark or bloody stools, constipation, yellowing of the skin, indigestion, nausea, change in bowel habits, diarrhea, abdominal pain or vomiting blood.     Genitourinary:  The patient denies frequent urination, needing to get up at night to urinate, urinary hesitancy or retaining urine, painful urination, urinary incontinence, decreased urine stream, blood in the urine or vaginal/penile discharge.    Skin:    The patient denies rash, itching, skin lesions, +dry skin, change in skin color or change in moles.     Hematologic/Lymphatic:  The patient denies easily bruising or bleeding or persistent swollen glands or lymph nodes.     Musculoskeletal:  The patient denies muscle aches/pain, joint pain, stiff joints, neck pain, back pain or bone pain.    Neuropsychiatric:  There is no history of migraines or severe headaches, seizure/epilepsy, speech problems, coordination problems, trembling/tremors, fainting/black outs, dizziness, memory problems, loss of sensation/numbness, problems walking, weakness, tingling or burning in hands/feet. There is no history of abusive relationship, bipolar disorder, sleep disturbance, +anxiety, depression or feeling of despair.    Endocrine:    There is no history of poor/slow wound healing, weight loss/gain, fertility or hormone problems, cold intolerance, thyroid disease.     Allergic/Immunologic:  There is no history of hives, hay fever, angioedema or anaphylaxis.    /86 (BP Location: Left arm, Patient Position: Sitting, Cuff Size: adult)   Pulse 93   Temp 98.2 °F (36.8 °C) (Temporal)   Resp 16   LMP 01/07/2024 (Exact Date)   SpO2 97%      Physical Exam:  The patient is an alert, oriented, well-nourished and  well-developed woman who appears her stated age. Her speech patterns and movements are normal. Her affect is appropriate.    HEENT: The head is normocephalic. The neck is supple. The thyroid is not enlarged and is without palpable masses/nodules. There are no palpable masses. The trachea is in the midline. Conjunctiva are clear, non-icteric.    Chest: The chest expands symmetrically. The lungs are clear to auscultation.    Heart: The rhythm is regular.  There are no murmurs, rubs, gallops or thrills.    Breasts:  Her breasts are symmetrical with a cup size 34C.  Right breast: The skin, nipple ,and areola appear normal. There is no skin dimpling with movement of the pectoralis. There is no nipple retraction. No nipple discharge can be elicited. The parenchyma is mildly nodular. There are no dominant masses in the breast. The axillary tail is normal.  There is a well-healed incision with no signs of infection.  Left breast:   The skin, nipple, and areola appear normal. There is no skin dimpling with movement of the pectoralis. There is no nipple retraction. No nipple discharge can be elicited. The parenchyma is mildly nodular. There are no dominant masses in the breast. The axillary tail is normal.    Abdomen:  The abdomen is soft, flat and non tender. The liver is not enlarged. There are no palpable masses.    Lymph Nodes:  The supraclavicular, axillary and cervical regions are free of significant lymphadenopathy.    Back: There is no vertebral column tenderness.    Skin: The skin appears normal. There are no suspicious appearing rashes or lesions.    Extremities: The extremities are without deformity, cyanosis or edema.    Impression:   Ms. Lyudmila Epstein is a 43 year old woman presents with imaging detected right breast radial scar, s/p excisional biopsy.    Recommendations:   I had a discussion with the Patient regarding her breast exam.  She  is healing well since surgery with no signs of infection. I personally reviewed her pathology.  Showed a benign residual radial scar with no atypia.  We discussed no further treatment is needed for this and that this does not increase future breast cancer risk.  Will plan on bilateral diagnostic evaluation with clinical exam in October 2024.  Anticipate if this is within normal limits she will resume screening the following year.  She was given ample opportunity for questions and those questions were answered to her satisfaction. She was encouraged to contact the office with any questions or concerns prior to her next scheduled appointment.

## 2024-01-20 ENCOUNTER — PATIENT MESSAGE (OUTPATIENT)
Facility: CLINIC | Age: 44
End: 2024-01-20

## 2024-01-21 NOTE — TELEPHONE ENCOUNTER
Lo Barrios, RN 1/21/2024 12:29 PM CST        ----- Message -----  From: Lyudmila Epstein  Sent: 1/20/2024 6:52 PM CST  To: Em Triage Support  Subject: Pneumonia     Hi Dr. Way,    Nice to meet you! I still feel short of breath from time to time when I am walking upstairs. Is this normal?     ThanksLyudmila

## 2024-01-24 ENCOUNTER — NURSE TRIAGE (OUTPATIENT)
Facility: CLINIC | Age: 44
End: 2024-01-24

## 2024-01-24 NOTE — TELEPHONE ENCOUNTER
Action Requested: Summary for Provider     []  Critical Lab, Recommendations Needed  [x] Need Additional Advice  []   FYI    []   Need Orders  [] Need Medications Sent to Pharmacy  []  Other     SUMMARY: Patient reports having shortness of breath when walking up the stairs. States she was dx with pneumonia at the beginning of January. Patient denies SOB when walking, or at rest and states she only notices when going up the stairs.   Patient states she exercises as well and does not experience SOB.  Denies: Chest pain  Patient prescribed Albuterol; however states she does not feel like it helps.   Follow up appt made with Dr. Way for 1/30  Advised patient if symptoms worsen to go to IC/ED; patient verbalized understanding.    Per protocol, patient to be seen in the office today. However patient would like to see Dr. Way.   Please advise if ok to wait until 1/30 or sooner appt available ?    Reason for call: Shortness Of Breath  Onset: 1/2/2024          Reason for Disposition   Patient wants to be seen    Protocols used: Breathing Difficulty-A-[place]

## 2024-01-24 NOTE — TELEPHONE ENCOUNTER
----- Message from Lyudmila Epstein sent at 1/23/2024  5:49 PM CST -----  Regarding: Pneumonia   Contact: [phone]  If I don’t feel like I am improving when walking up steps, should I schedule a follow up? I felt fine a week ago and the “shortness of breath” seems to be a more recent symptom post pneumonia.

## 2024-01-24 NOTE — TELEPHONE ENCOUNTER
Left message to call back-transfer to triage.    [facility] message read.     Last read by Lyudmila Epstein at  6:50 AM on 1/24/2024.

## 2024-01-25 NOTE — TELEPHONE ENCOUNTER
Dr. Seamus BALL , patient will try to use albuterol Inhaler when using stairs.     RN called patient. Patient's date of birth and full name both confirmed.   RN informed of provider's message as detailed .   Currently, Denies shortness of breath, difficulty breathing, chest pain, chest pressure.   Confirms that shortness of breath is only with stairs and that it resolves with rest.  Previously used albuterol inhaler, but has not tried albuterol inhaler when SOB with stairs. RN advised her to try using it.   Also advised deep breathing exercises to promote lung health.     RN also advised ER or call 911 if patient develops constant difficulty breathing, chest pressure, chest pain, dizziness, headache, altered speech, vision changes, severe pain.   She verbalizes understanding of all information, and agreeable to plan.       Future Appointments   Date Time Provider Department Center   1/30/2024 10:00 AM Zehra Way MD [facility] FP [facility]   2/12/2024  1:00 PM [facility] XR [facility]  [place]

## 2024-01-25 NOTE — TELEPHONE ENCOUNTER
As long as shortness of breath remains unchanged and only with stairs okay to wait. If worsens and or becomes associated with other symptoms to be seen in urgent care or ED as recommended.    Zehra Way MD, 01/25/24, 8:16 AM

## 2024-01-30 ENCOUNTER — OFFICE VISIT (OUTPATIENT)
Facility: CLINIC | Age: 44
End: 2024-01-30
Payer: COMMERCIAL

## 2024-01-30 ENCOUNTER — HOSPITAL ENCOUNTER (OUTPATIENT)
Dept: GENERAL RADIOLOGY | Age: 44
Discharge: HOME | End: 2024-01-30
Attending: FAMILY MEDICINE
Payer: COMMERCIAL

## 2024-01-30 VITALS — SYSTOLIC BLOOD PRESSURE: 130 MMHG | DIASTOLIC BLOOD PRESSURE: 60 MMHG | OXYGEN SATURATION: 97 % | HEART RATE: 118 BPM

## 2024-01-30 DIAGNOSIS — K21.9 GASTROESOPHAGEAL REFLUX DISEASE WITHOUT ESOPHAGITIS: ICD-10-CM

## 2024-01-30 DIAGNOSIS — J18.9 PNEUMONIA OF RIGHT LOWER LOBE DUE TO INFECTIOUS ORGANISM: Primary | ICD-10-CM

## 2024-01-30 DIAGNOSIS — R06.02 SHORTNESS OF BREATH: ICD-10-CM

## 2024-01-30 DIAGNOSIS — J18.9 PNEUMONIA OF RIGHT LOWER LOBE DUE TO INFECTIOUS ORGANISM: ICD-10-CM

## 2024-01-30 DIAGNOSIS — A08.4 VIRAL GASTROENTERITIS: ICD-10-CM

## 2024-01-30 PROCEDURE — 3078F DIAST BP <80 MM HG: CPT | Performed by: FAMILY MEDICINE

## 2024-01-30 PROCEDURE — 99214 OFFICE O/P EST MOD 30 MIN: CPT | Performed by: FAMILY MEDICINE

## 2024-01-30 PROCEDURE — 3075F SYST BP GE 130 - 139MM HG: CPT | Performed by: FAMILY MEDICINE

## 2024-01-30 RX ORDER — PANTOPRAZOLE SODIUM 40 MG/1
40 TABLET, DELAYED RELEASE ORAL
Qty: 90 TABLET | Refills: 0 | Status: SHIPPED | OUTPATIENT
Start: 2024-01-30 | End: 2024-04-29

## 2024-01-30 NOTE — PROGRESS NOTES
Subjective:   Lyudmila Epstein is a 43 year old female who presents for Pneumonia (Patient comes to the office to follow up on pneumonia. Patient complains of SOB with exertion. She also states she has been new GI symptoms, such as, burping, gas, diarrhea and vomiting, and stomach pain.)     Patient presents for follow up pneumonia. At last visit had ordered CXR which demonstrated improvement in infiltrate. Since then patient has noted shortness of breath when going up stairs. Otherwise able to work out normally. At last visit had further dated CXR order for 1 month as radiology recommended following until resolution.     Patient also having new GI symptoms including belching, gassiness for the past 5 days. Occurred with any food despite how bland. Is on famotidine 20 mg twice daily. Then developed vomiting and diarrhea last night. Vomited 2-3 times. NBNB. Had watery diarrhea 3-4 times. Had watery stool this morning. Does have GI appointment in March. Does suffer from constipation. Takes miralax. No longer vomiting today. Tolerate po meal.       History/Other:    Chief Complaint Reviewed and Verified  Nursing Notes Reviewed and   Verified  Tobacco Reviewed  Allergies Reviewed  Medications Reviewed    Problem List Reviewed  Medical History Reviewed  Surgical History   Reviewed  Family History Reviewed  Social History Reviewed         Tobacco:  She has never smoked tobacco.    Current Outpatient Medications   Medication Sig Dispense Refill    pantoprazole 40 MG Oral Tab EC Take 1 tablet (40 mg total) by mouth every morning before breakfast. 90 tablet 0    hydrOXYzine 25 MG Oral Tab Take 1 tablet (25 mg total) by mouth as needed for Anxiety.      tretinoin 0.025 % External Cream       venlafaxine ER 75 MG Oral Capsule SR 24 Hr Take 2 capsules (150 mg total) by mouth daily. TAKE 1 CAPSULE BY MOUTH DAILY WITH FOOD. TAKE WITH THE 37.5MG CAPSULE FOR TOTAL DAILY DOSE .5MG      Multiple Vitamin (MULTI-DAY) Oral  Tab Take by mouth every morning.           Review of Systems:  Review of Systems   Constitutional: Negative.    Respiratory:  Positive for shortness of breath.    Cardiovascular: Negative.    Gastrointestinal:  Positive for abdominal pain, diarrhea, nausea and vomiting.   Skin: Negative.    Neurological: Negative.        Objective:   /60 (BP Location: Left arm, Patient Position: Sitting, Cuff Size: adult)   Pulse 118   LMP 01/07/2024 (Exact Date)   SpO2 97%  Estimated body mass index is 27.88 kg/m² as calculated from the following:    Height as of 12/14/23: 5' 7\" (1.702 m).    Weight as of 12/14/23: 178 lb (80.7 kg).  Physical Exam  Vitals and nursing note reviewed.   Constitutional:       General: She is not in acute distress.     Appearance: Normal appearance.   HENT:      Head: Normocephalic and atraumatic.   Eyes:      General:         Right eye: No discharge.         Left eye: No discharge.      Comments: Extraocular eye movements grossly intact   Cardiovascular:      Rate and Rhythm: Regular rhythm. Tachycardia present.      Heart sounds: Normal heart sounds. No murmur heard.     No friction rub. No gallop.   Pulmonary:      Effort: Pulmonary effort is normal. No respiratory distress.      Breath sounds: Normal breath sounds. No stridor. No wheezing, rhonchi or rales.   Chest:      Chest wall: No tenderness.   Abdominal:      General: Abdomen is flat. There is no distension.      Palpations: Abdomen is soft. There is no mass.      Tenderness: There is no abdominal tenderness. There is no guarding or rebound.      Hernia: No hernia is present.   Musculoskeletal:      Comments: Normal gait   Skin:     Findings: No rash.   Neurological:      General: No focal deficit present.      Mental Status: She is alert. Mental status is at baseline.   Psychiatric:         Mood and Affect: Mood normal.         Behavior: Behavior normal.         Assessment & Plan:   1. Pneumonia of right lower lobe due to infectious  organism (Primary), 2. Shortness of breath  -     XR CHEST PA + LAT CHEST (CPT=71046); Future; Expected date: 01/30/2024    -lungs clear to auscultation bilaterally in office. No respiratory distress. Will have patient obtain CXR early to ensure xray improving. Given multiple recent xrays may hold off on further xrays as long as infiltrate improving    3. Gastroesophageal reflux disease without esophagitis  -     Pantoprazole Sodium; Take 1 tablet (40 mg total) by mouth every morning before breakfast.  Dispense: 90 tablet; Refill: 0    -will trial PPI as written above. Patient does have GI appointment in march 4. Viral gastroenteritis  -counseled patient on PO hydration. To avoid imodium. The patient educated on disease process, medication use and side effects, and when to seek emergency care versus PCP follow up.         Return if symptoms worsen or fail to improve.    Zehra Way MD, 1/30/2024, 9:47 AM

## 2024-02-15 ENCOUNTER — PATIENT MESSAGE (OUTPATIENT)
Facility: CLINIC | Age: 44
End: 2024-02-15

## 2024-02-15 DIAGNOSIS — K21.9 GASTROESOPHAGEAL REFLUX DISEASE WITHOUT ESOPHAGITIS: Primary | ICD-10-CM

## 2024-02-19 RX ORDER — SUCRALFATE 1 G/1
1 TABLET ORAL
Qty: 120 TABLET | Refills: 0 | Status: SHIPPED | OUTPATIENT
Start: 2024-02-19 | End: 2024-03-20

## 2024-02-20 ENCOUNTER — OFFICE VISIT (OUTPATIENT)
Facility: CLINIC | Age: 44
End: 2024-02-20
Payer: COMMERCIAL

## 2024-02-20 VITALS — OXYGEN SATURATION: 97 % | DIASTOLIC BLOOD PRESSURE: 80 MMHG | HEART RATE: 89 BPM | SYSTOLIC BLOOD PRESSURE: 136 MMHG

## 2024-02-20 DIAGNOSIS — R14.0 FLATULENCE/GAS PAIN/BELCHING: Primary | ICD-10-CM

## 2024-02-20 PROCEDURE — 99213 OFFICE O/P EST LOW 20 MIN: CPT | Performed by: FAMILY MEDICINE

## 2024-02-20 NOTE — PATIENT INSTRUCTIONS
Don't forget to come back fasted for your labs, 8 hours nothing to eat or drink except water. Lab hours are from 7:30 a.m. -4:00 p.m. Monday through Friday.      The [facility], [place] lab hours are Monday through Friday 6:30am - 8pm and Saturday through Sunday 6:30am - 3pm.

## 2024-02-20 NOTE — PROGRESS NOTES
Subjective:   Lyudmila Epstein is a 43 year old female who presents for Pain (Patient complains of flatulence, burping, and lower abdominal pain x one month. Patient states the antiacid medication did not help. )     Patient presents for follow up GI symptoms including flatulence, burping and abdominal pain. Is on pantoprazole which did not help symptoms. Since no improvement at all with symptoms patient has stopped medications and is not going to trial sucrate. Last PPI dose was Saturday. No constipation. Normal color. No melena. Continues to have belching.     History/Other:    Chief Complaint Reviewed and Verified  Nursing Notes Reviewed and   Verified  Tobacco Reviewed  Allergies Reviewed  Medications Reviewed    Problem List Reviewed  Medical History Reviewed  Surgical History   Reviewed  Family History Reviewed  Social History Reviewed         Tobacco:  She has never smoked tobacco.    Current Outpatient Medications   Medication Sig Dispense Refill    hydrOXYzine 25 MG Oral Tab Take 1 tablet (25 mg total) by mouth as needed for Anxiety.      tretinoin 0.025 % External Cream       venlafaxine ER 75 MG Oral Capsule SR 24 Hr Take 2 capsules (150 mg total) by mouth daily. TAKE 1 CAPSULE BY MOUTH DAILY WITH FOOD. TAKE WITH THE 37.5MG CAPSULE FOR TOTAL DAILY DOSE .5MG      Multiple Vitamin (MULTI-DAY) Oral Tab Take by mouth every morning.      sucralfate 1 g Oral Tab Take 1 tablet (1 g total) by mouth 4 (four) times daily before meals and nightly. (Patient not taking: Reported on 2/20/2024) 120 tablet 0    pantoprazole 40 MG Oral Tab EC Take 1 tablet (40 mg total) by mouth every morning before breakfast. (Patient not taking: Reported on 2/20/2024) 90 tablet 0         Review of Systems:  Review of Systems   Constitutional: Negative.    Respiratory: Negative.     Cardiovascular: Negative.    Gastrointestinal: Negative.         +belching and flatulence   Skin: Negative.    Neurological: Negative.           Objective:   /80 (BP Location: Left arm, Patient Position: Sitting, Cuff Size: adult)   Pulse 89   LMP 02/10/2024 (Exact Date)   SpO2 97%  Estimated body mass index is 27.88 kg/m² as calculated from the following:    Height as of 12/14/23: 5' 7\" (1.702 m).    Weight as of 12/14/23: 178 lb (80.7 kg).  Physical Exam  Vitals and nursing note reviewed.   Constitutional:       General: She is not in acute distress.     Appearance: Normal appearance.   HENT:      Head: Normocephalic and atraumatic.   Eyes:      General:         Right eye: No discharge.         Left eye: No discharge.      Comments: Extraocular eye movements grossly intact   Pulmonary:      Effort: Pulmonary effort is normal.   Abdominal:      General: Abdomen is flat. Bowel sounds are normal. There is no distension.      Palpations: Abdomen is soft. There is no mass.      Tenderness: There is no abdominal tenderness. There is no guarding or rebound.   Musculoskeletal:      Comments: Normal gait   Skin:     Findings: No rash.   Neurological:      General: No focal deficit present.      Mental Status: She is alert. Mental status is at baseline.   Psychiatric:         Mood and Affect: Mood normal.         Behavior: Behavior normal.           Assessment & Plan:   1. Flatulence/gas pain/belching (Primary)  -     CBC With Differential With Platelet  -     Comp Metabolic Panel (14); Future; Expected date: 02/20/2024  -     TSH W Reflex To Free T4; Future; Expected date: 02/20/2024  -     Lipase; Future; Expected date: 02/20/2024  -     CELIAC DISEASE SCREEN Reflex; Future; Expected date: 02/20/2024  -agree with patient that since medications did not help at all okay to stop and not start sucralfate. Ordered labs as listed to rule out any other etiology for flatulence and burping. Has GI appointment on 2/29.      Return if symptoms worsen or fail to improve.    Zehra Way MD, 2/20/2024, 4:28 PM

## 2024-02-25 NOTE — H&P
[facility] - Gastroenterology                                                                                                               Reason for consult: eval    Requesting physician or provider: Nazia Velasco DO    No chief complaint on file.      HPI:   Lyudmila Epstein is a 43 year old year-old female with history of anxiety/depression, ibs, pupp:    she is here today for evaluation belching, gas    Seen at [place] 2021 for diarrhea  Kub-mild stool burden  Cln/egd 10/2021    Path   Non-specific duodenal findings    Hp neg  Hyperplastic polyp at   No microscopic colitis    Ttg IgG neg  - did not have IgG level    Celiac serology testing ordered (2/20/24) - not yet done    Resulted in finding of bowel endometriosis    Here today for bloating, belching, gas  Had lumpectomy, covid, pneumonia around dec  Sx triggered with drinking coffee    Started pantoprazole 40 mg about a mos ago. Stopped 8 or 9 days ago bc has no noticeable improvement    Mild reflux x last mos.  she denies dysphagia, odynophagia and/or globus. she denies epigastric pain abdominal pain. she denies nausea and/or vomiting.  she denies recent change in appetite and/or unintentional weight loss.    No cp, exertional sx    she moves her bowels 2-3 times per day and without recent change. she has h/o constipation and uses miralax prn.  she denies brbpr and/or melena.    NSAIDS: as needed. Had recent gum recession surgery and using for pain  Tobacco: no  Alcohol: no  Marijuana: no  Illicit drugs: no    No FH GI malignancy, ibd, celiac    No history of adverse reaction to sedation  No SORAIDA  No anticoagulants  No pacemaker/defibrillator  No pain medications and/or sleep aides    KUB 8/2021  Impression    IMPRESSION:  Mild colonic stool burden.    Finalized report approved by Attending: Beena Escalante MD on 8/23/2021 3:16 PM.    Cln/egd 10/2021 at [facility]  DIAGNOSIS   A. DUODENAL; BIOPSY:    -DUODENAL MUCOSA WITH PATCHY BORDERLINE INTRAEPITHELIAL LYMPHOCYTOSIS AND   PRESERVED VILLOUS ARCHITECTURE   -SEE COMMENT     B. GASTRIC; BIOPSY:    -GASTRIC ANTRAL AND OXYNTIC GLAND MUCOSA MILD INACTIVE CHRONIC GASTRITIS-NO   MORPHOLOGIC EVIDENCE OF H. PYLORI     C. RANDOM COLON; BIOPSY:    -COLONIC MUCOSA WITHIN NORMAL LIMITS-SEE COMMENT     D. HEPATIC FLEXURE POLYP; BIOPSY:    -HYPERPLASTIC POLYP     E. RECTAL; BIOPSY:    -RECTAL MUCOSA WITHIN NORMAL LIMITS-SEE COMMENT       COMMENT:   -Specimen part A shows duodenal mucosa with patchy borderline intraepithelial   lymphocytosis and preserved villous   architecture. This occurs in celiac disease but has a broad differential   diagnosis including drugs (e.g., olmesartan,   possibly NSAIDs), non-gluten food allergies and early immune-mediated diseases.   No specific cause is evident in many   cases. Clinical correlation is advised.   -For specimen part C and E, there is no morphologic evidence of collagenous or   lymphocytic colitis, significant basal   lymphoplasmacytosis, cryptitis, granuloma or parasitic ova.       Wt Readings from Last 6 Encounters:   23 178 lb (80.7 kg)   23 160 lb (72.6 kg)   23 165 lb (74.8 kg)   02/15/23 158 lb (71.7 kg)   22 158 lb (71.7 kg)   22 158 lb (71.7 kg)        History, Medications, Allergies, ROS:      Past Medical History:   Diagnosis Date    Anxiety     Depression     IBS (irritable bowel syndrome)     Pneumonia due to organism 2018    Bacterial    PUPP (pruritic urticarial papules and plaques of pregnancy) (HCC)     Visual impairment     Reading glasses      Past Surgical History:   Procedure Laterality Date          2018    CYST ASPIRATION LEFT      Done at [facility] in [place]    ENDOMETRIAL ABLATION  2022    HC  SECTION LEVEL I      AIDA LOCALIZATION WIRE 1 SITE RIGHT (CPT=19281)  2023    NEEDLE BIOPSY RIGHT Right 10/19/2023      bx      2018, 2013    WISDOM TEETH REMOVED        Family Hx:   Family History   Problem Relation Age of Onset    No Known Problems Father     Thyroid disease Mother         Hyperthyroidism     Depression Mother     No Known Problems Son     Heart Disorder Maternal Grandfather     Thyroid disease Maternal Grandfather     Cancer Paternal Grandmother 88        Leukemia    No Known Problems Sister       Social History:   Social History     Socioeconomic History    Marital status:    Tobacco Use    Smoking status: Never    Smokeless tobacco: Never   Vaping Use    Vaping Use: Never used   Substance and Sexual Activity    Alcohol use: Yes     Comment: minimal    Drug use: Never    Sexual activity: Yes     Partners: Male     Birth control/protection: Condom   Other Topics Concern    Caffeine Concern No    Exercise Yes    Seat Belt No    Special Diet No    Stress Concern No    Weight Concern Yes    Blood Transfusions No        Medications (Active prior to today's visit):  Current Outpatient Medications   Medication Sig Dispense Refill    sucralfate 1 g Oral Tab Take 1 tablet (1 g total) by mouth 4 (four) times daily before meals and nightly. (Patient not taking: Reported on 2024) 120 tablet 0    pantoprazole 40 MG Oral Tab EC Take 1 tablet (40 mg total) by mouth every morning before breakfast. (Patient not taking: Reported on 2024) 90 tablet 0    hydrOXYzine 25 MG Oral Tab Take 1 tablet (25 mg total) by mouth as needed for Anxiety.      tretinoin 0.025 % External Cream       venlafaxine ER 75 MG Oral Capsule SR 24 Hr Take 2 capsules (150 mg total) by mouth daily. TAKE 1 CAPSULE BY MOUTH DAILY WITH FOOD. TAKE WITH THE 37.5MG CAPSULE FOR TOTAL DAILY DOSE .5MG      Multiple Vitamin (MULTI-DAY) Oral Tab Take by mouth every morning.         Allergies:  No Known Allergies    ROS:   CONSTITUTIONAL: negative for fevers, chills, sweats and weight loss  EYES Negative for red eyes, yellow eyes,  changes in vision  HEENT: Negative for dysphagia and hoarseness  RESPIRATORY: Negative for cough and shortness of breath  CARDIOVASCULAR: Negative for chest pain, palpitations  GASTROINTESTINAL: See HPI  GENITOURINARY: Negative for dysuria and frequency  MUSCULOSKELETAL: Negative for arthralgias and myalgias  NEUROLOGICAL: Negative for dizziness and headaches  BEHAVIOR/PSYCH: Negative for anxiety and poor appetite    PHYSICAL EXAM:   Last menstrual period 02/10/2024.    GEN: WD/WN, NAD  HEENT: Supple symmetrical, trachea midline  CV: RRR, the extremities are warm and well perfused   LUNGS: No increased work of breathing  ABDOMEN: No scars, normal bowel sounds, soft, non-tender, non-distended no rebound or guarding, no masses, no hepatomegaly  MSK: No redness, no warmth, no swelling of joints  SKIN: No jaundice, no erythema, no rashes  HEMATOLOGIC: No bleeding, no bruising  NEURO: Alert and interactive, normal gait    Labs/Imaging/Procedures:     Patient's pertinent labs and imaging were reviewed and discussed with patient today.        .  ASSESSMENT/PLAN:   Lyudmila Epstein is a 43 year old year-old female with history of anxiety/depression, ibs, pupp:    #constipation  #crc screening  Chronic sx improved with use of miralax prn. No brbpr, melena, unintentional weight loss.  Last c-scope 2021. No significant findings.  Recommended repeat age 46.    #gerd  #bloating  #belching  #gas  Sx seemingly following covid, pneumonia.  No abd pain/epigastric pain, no n/v, melena, dysphagia. Tried 30 days of ppi w/o improvement. Noted non-specific findings on egd 2021 in duodenum. Do recommend celiac testing.  Neg hp on gastric bx sampling.     -cln due age 46  -miralax  -reflux diet modification  -celiac testing, labs per pcp  -hpylori testing  -pepcid 40 mg twice daily  -ctm for need for egd            Orders This Visit:  No orders of the defined types were placed in this encounter.      Meds This Visit:  Requested Prescriptions       No prescriptions requested or ordered in this encounter       Imaging & Referrals:  None      Lurdes Wilson, APRN   2/29/2024        This note was partially prepared using Dragon Medical voice recognition dictation software. As a result, errors may occur. When identified, these errors have been corrected. While every attempt is made to correct errors during dictation, discrepancies may still exist.

## 2024-02-29 ENCOUNTER — OFFICE VISIT (OUTPATIENT)
Dept: GASTROENTEROLOGY | Facility: CLINIC | Age: 44
End: 2024-02-29
Payer: COMMERCIAL

## 2024-02-29 ENCOUNTER — TELEPHONE (OUTPATIENT)
Dept: GASTROENTEROLOGY | Facility: CLINIC | Age: 44
End: 2024-02-29

## 2024-02-29 VITALS
HEART RATE: 98 BPM | BODY MASS INDEX: 26.37 KG/M2 | WEIGHT: 170 LBS | SYSTOLIC BLOOD PRESSURE: 144 MMHG | HEIGHT: 67.5 IN | DIASTOLIC BLOOD PRESSURE: 84 MMHG

## 2024-02-29 DIAGNOSIS — R14.2 BELCHING: Primary | ICD-10-CM

## 2024-02-29 DIAGNOSIS — R14.0 BLOATING: ICD-10-CM

## 2024-02-29 DIAGNOSIS — R14.0 GASSINESS: ICD-10-CM

## 2024-02-29 DIAGNOSIS — K59.00 CONSTIPATION, UNSPECIFIED CONSTIPATION TYPE: ICD-10-CM

## 2024-02-29 DIAGNOSIS — Z12.11 COLON CANCER SCREENING: ICD-10-CM

## 2024-02-29 RX ORDER — IBUPROFEN 800 MG/1
800 TABLET ORAL EVERY 6 HOURS PRN
COMMUNITY
Start: 2024-02-22

## 2024-02-29 RX ORDER — HYDROCODONE BITARTRATE AND ACETAMINOPHEN 5; 325 MG/1; MG/1
1 TABLET ORAL EVERY 6 HOURS PRN
COMMUNITY
Start: 2024-02-22

## 2024-02-29 RX ORDER — AMOXICILLIN 500 MG/1
500 CAPSULE ORAL 3 TIMES DAILY
COMMUNITY
Start: 2024-02-22

## 2024-02-29 RX ORDER — CHLORHEXIDINE GLUCONATE ORAL RINSE 1.2 MG/ML
SOLUTION DENTAL
COMMUNITY
Start: 2024-02-22

## 2024-02-29 NOTE — PATIENT INSTRUCTIONS
-cln due age 46  -miralax  -reflux diet modification  -celiac testing  -hpylori testing  -pepcid 40 mg twice daily  -ctm for need for egd            Tips to Control Acid Reflux    To control acid reflux, you’ll need to make some basic diet and lifestyle changes. The simple steps outlined below may be all you’ll need to ease discomfort.   Watch what you eat  Don't have fatty foods or spicy foods.  Eat fewer acidic foods, such as citrus and tomato-based foods. These can increase symptoms.  Limit drinking alcohol, caffeine, and fizzy beverages. All increase acid reflux.  Try limiting chocolate, peppermint, and spearmint. These can make acid reflux worse in some people.     Watch when you eat  Don't lie down for 3 hours after eating.  Don't snack before going to bed.     Raise your head  Raising your head and upper body by 4 to 6 inches helps limit reflux when you’re lying down. Put blocks under the head of your bed frame or a wedge under your mattress to raise it.   Other changes  Lose weight, if you need to  Don’t exercise near bedtime  Don't wear tight-fitting clothes  Limit aspirin and ibuprofen  Stop smoking     [employer] last reviewed this educational content on 6/1/2019  © 3267-9951 [employer]. [address] [place] [address], [place] [address]. All rights reserved. This information is not intended as a substitute for professional medical care. Always follow your healthcare professional's instructions.

## 2024-02-29 NOTE — TELEPHONE ENCOUNTER
Health maintenance updated and message sent to patient outreach to schedule colonoscopy in 3 years

## 2024-03-04 ENCOUNTER — LAB ENCOUNTER (OUTPATIENT)
Dept: LAB | Facility: REFERENCE LAB | Age: 44
End: 2024-03-04
Attending: FAMILY MEDICINE
Payer: COMMERCIAL

## 2024-03-04 DIAGNOSIS — K59.00 CONSTIPATION, UNSPECIFIED CONSTIPATION TYPE: ICD-10-CM

## 2024-03-04 DIAGNOSIS — Z12.11 COLON CANCER SCREENING: ICD-10-CM

## 2024-03-04 DIAGNOSIS — R14.2 BELCHING: ICD-10-CM

## 2024-03-04 DIAGNOSIS — R14.0 FLATULENCE/GAS PAIN/BELCHING: ICD-10-CM

## 2024-03-04 DIAGNOSIS — R14.0 BLOATING: ICD-10-CM

## 2024-03-04 DIAGNOSIS — R14.0 GASSINESS: ICD-10-CM

## 2024-03-04 LAB
ALBUMIN SERPL-MCNC: 4.6 G/DL (ref 3.2–4.8)
ALBUMIN/GLOB SERPL: 1.7 {RATIO} (ref 1–2)
ALP LIVER SERPL-CCNC: 89 U/L
ALT SERPL-CCNC: 18 U/L
ANION GAP SERPL CALC-SCNC: 5 MMOL/L (ref 0–18)
AST SERPL-CCNC: 14 U/L (ref ?–34)
BASOPHILS # BLD AUTO: 0.05 X10(3) UL (ref 0–0.2)
BASOPHILS NFR BLD AUTO: 0.7 %
BILIRUB SERPL-MCNC: 0.3 MG/DL (ref 0.3–1.2)
BUN BLD-MCNC: 11 MG/DL (ref 9–23)
BUN/CREAT SERPL: 16.9 (ref 10–20)
CALCIUM BLD-MCNC: 9.8 MG/DL (ref 8.7–10.4)
CHLORIDE SERPL-SCNC: 108 MMOL/L (ref 98–112)
CO2 SERPL-SCNC: 25 MMOL/L (ref 21–32)
CREAT BLD-MCNC: 0.65 MG/DL
DEPRECATED RDW RBC AUTO: 39.4 FL (ref 35.1–46.3)
EGFRCR SERPLBLD CKD-EPI 2021: 113 ML/MIN/1.73M2 (ref 60–?)
EOSINOPHIL # BLD AUTO: 0.35 X10(3) UL (ref 0–0.7)
EOSINOPHIL NFR BLD AUTO: 4.9 %
ERYTHROCYTE [DISTWIDTH] IN BLOOD BY AUTOMATED COUNT: 12.5 % (ref 11–15)
FASTING STATUS PATIENT QL REPORTED: YES
GLOBULIN PLAS-MCNC: 2.7 G/DL (ref 2.8–4.4)
GLUCOSE BLD-MCNC: 100 MG/DL (ref 70–99)
HCT VFR BLD AUTO: 40.7 %
HGB BLD-MCNC: 13.4 G/DL
IGA SERPL-MCNC: 208.6 MG/DL (ref 40–350)
IGA SERPL-MCNC: 208.6 MG/DL (ref 70–312)
IMM GRANULOCYTES # BLD AUTO: 0.02 X10(3) UL (ref 0–1)
IMM GRANULOCYTES NFR BLD: 0.3 %
LIPASE SERPL-CCNC: 37 U/L (ref 12–53)
LYMPHOCYTES # BLD AUTO: 2.36 X10(3) UL (ref 1–4)
LYMPHOCYTES NFR BLD AUTO: 32.9 %
MCH RBC QN AUTO: 28.6 PG (ref 26–34)
MCHC RBC AUTO-ENTMCNC: 32.9 G/DL (ref 31–37)
MCV RBC AUTO: 86.8 FL
MONOCYTES # BLD AUTO: 0.51 X10(3) UL (ref 0.1–1)
MONOCYTES NFR BLD AUTO: 7.1 %
NEUTROPHILS # BLD AUTO: 3.88 X10 (3) UL (ref 1.5–7.7)
NEUTROPHILS # BLD AUTO: 3.88 X10(3) UL (ref 1.5–7.7)
NEUTROPHILS NFR BLD AUTO: 54.1 %
OSMOLALITY SERPL CALC.SUM OF ELEC: 285 MOSM/KG (ref 275–295)
PLATELET # BLD AUTO: 288 10(3)UL (ref 150–450)
POTASSIUM SERPL-SCNC: 3.9 MMOL/L (ref 3.5–5.1)
PROT SERPL-MCNC: 7.3 G/DL (ref 5.7–8.2)
RBC # BLD AUTO: 4.69 X10(6)UL
SODIUM SERPL-SCNC: 138 MMOL/L (ref 136–145)
TSI SER-ACNC: 3.24 MIU/ML (ref 0.55–4.78)
WBC # BLD AUTO: 7.2 X10(3) UL (ref 4–11)

## 2024-03-04 PROCEDURE — 85025 COMPLETE CBC W/AUTO DIFF WBC: CPT | Performed by: FAMILY MEDICINE

## 2024-03-04 PROCEDURE — 36415 COLL VENOUS BLD VENIPUNCTURE: CPT

## 2024-03-04 PROCEDURE — 83690 ASSAY OF LIPASE: CPT

## 2024-03-04 PROCEDURE — 80053 COMPREHEN METABOLIC PANEL: CPT

## 2024-03-04 PROCEDURE — 83013 H PYLORI (C-13) BREATH: CPT

## 2024-03-04 PROCEDURE — 82784 ASSAY IGA/IGD/IGG/IGM EACH: CPT

## 2024-03-04 PROCEDURE — 86364 TISS TRNSGLTMNASE EA IG CLAS: CPT

## 2024-03-04 PROCEDURE — 84443 ASSAY THYROID STIM HORMONE: CPT

## 2024-03-05 LAB
H PYLORI BREATH TEST: NEGATIVE
TTG IGA SER-ACNC: 0.5 U/ML (ref ?–7)

## 2024-03-07 ENCOUNTER — PATIENT MESSAGE (OUTPATIENT)
Dept: GASTROENTEROLOGY | Facility: CLINIC | Age: 44
End: 2024-03-07

## 2024-03-07 DIAGNOSIS — R14.2 BELCHING: Primary | ICD-10-CM

## 2024-03-07 NOTE — TELEPHONE ENCOUNTER
From: Lyudmila Epstein  To: Lurdes Wilson  Sent: 3/7/2024 11:47 AM CST  Subject: Follow up     Hi Lurdes,    Nice to meet you! The H Pylori test was negative which is good. I was on an antibiotic (for my oral surgery) when the test was administered. Hope that was okay.     Will the Pepcid help decrease the burping?  -Lyudmila

## 2024-03-21 NOTE — TELEPHONE ENCOUNTER
Regarding: Follow up   Contact: [phone]  ----- Message from Cathy Parra RN sent at 3/7/2024 12:07 PM CST -----       ----- Message from Lyudmila Epstein to Lurdes Wilson APRN sent at 3/7/2024 11:47 AM -----   Jack Chatman    Nice to meet you! The H Pylori test was negative which is good. I was on an antibiotic (for my oral surgery) when the test was administered. Hope that was okay.     Will the Pepcid help decrease the burping?  -Lyudmila

## 2024-03-21 NOTE — TELEPHONE ENCOUNTER
I contacted the pt by phone.    She was taking amoxicillin for oral surgery.      She reports on-going c/o belching. Sx seemingly worse after coffee.    She is currently taking pepcid 40 mg twice daily  Had tried pantoprazole and did not help with her symptoms    Has been taking align    Recommend:  Reflux diet modification  Plan to repeat hp testing   Fdgard trial  F/u in clinic and consider need for further w/u

## 2024-04-30 ENCOUNTER — PATIENT MESSAGE (OUTPATIENT)
Dept: GASTROENTEROLOGY | Facility: CLINIC | Age: 44
End: 2024-04-30

## 2024-05-01 ENCOUNTER — TELEPHONE (OUTPATIENT)
Facility: CLINIC | Age: 44
End: 2024-05-01

## 2024-05-01 NOTE — TELEPHONE ENCOUNTER
From: Lyudmila Epstein  To: Lurdes Wilson  Sent: 4/30/2024 2:27 PM CDT  Subject: Appointment on 5-16    Hi Lurdes,    May I change my appointment from in-person to telehealth on 5-16?    Thanks,  Lyudmila  
Lurdes,   Are you agreeable to a tele visit on 5/16 instead of in person? If so, I can see if pt is agreeable to also moving to 1200 slot that day. Please advise.  Thanks,  Yajaira  
Pt read eMithilaHaatt message from RN on 5/1/24 stating appt was changed to virtual visit for 5/16/24.  
RN LMTCB, APN agreeable to making appt virtual but RN inquiring if pt can move appt up to 1200 noon on same day. Gi office number provided.   
Telehealth is okay.    Thanks,  Lurdes  
no

## 2024-05-01 NOTE — TELEPHONE ENCOUNTER
RN canceled 1300 in person appt and scheduled for pt for virtual visit with APN at 1200 on 5/16/24. Woppa message sent with instructions. GI office number provided for any needs.

## 2024-05-01 NOTE — TELEPHONE ENCOUNTER
Patient returning call, states is okay with moving appointment up to noon. Thank you (see patient message from 4/30/24)

## 2024-05-20 NOTE — PROGRESS NOTES
The patient verbally consents to a Virtual/Telephone Check-In visit on 05/20/24.     Patient understands and accepts financial responsibility for any deductible, co-insurance and/or co-pays associated with this service.     Duration of the service: 30 minutes      [facility] - Gastroenterology                                                                                                               Reason for consult: f/u    Requesting physician or provider: Nazia Velasco DO    Chief Complaint   Patient presents with    Follow - Up       HPI:   Lyudmila Epstein is a 43 year old year-old female with history of anxiety, depression, IBS, pupp:    she is here today for f/u  She reports belching and gas have improved since last visit    she moves her bowels 2-3 times per day and without recent change. She has constipation and is taking citrucel with improvement.   she denies brbpr and/or melena.    Heartburn improved from last visit and with use of pepcid once daily. she denies dysphagia, odynophagia and/or globus. she denies abdominal pain. she denies nausea and/or vomiting.  she denies recent change in appetite and/or unintentional weight loss.    On-going bloating. Coffee may be trigger. Improved with bm. No abnormal vaginal bleeding.     NSAIDS: as needed  Tobacco: no  Alcohol: no  Marijuana: no  Illicit drugs: no     No FH GI malignancy, ibd, celiac    No history of adverse reaction to sedation  No SORAIDA  No anticoagulants  No pacemaker/defibrillator  No pain medications and/or sleep aides    Cln/egd 10/2021     Path   Non-specific duodenal findings    Hp neg  Hyperplastic polyp at HF  No microscopic colitis         Wt Readings from Last 6 Encounters:   02/29/24 170 lb (77.1 kg)   12/14/23 178 lb (80.7 kg)   08/16/23 160 lb (72.6 kg)   06/07/23 165 lb (74.8 kg)   02/15/23 158 lb (71.7 kg)   11/01/22 158 lb (71.7 kg)        History, Medications, Allergies, ROS:      Past Medical History:    Anxiety    Depression     IBS (irritable bowel syndrome)    Pneumonia due to organism    Bacterial    PUPP (pruritic urticarial papules and plaques of pregnancy) (HCC)    Visual impairment    Reading glasses      Past Surgical History:   Procedure Laterality Date          2018    Cyst aspiration left      Done at [facility] in [place]    Endometrial ablation  2022    Hc  section level i      Cailin localization wire 1 site right (cpt=19281)  2023    Needle biopsy right Right 10/19/2023    US bx      2018, 2013    Tony teeth removed        Family Hx:   Family History   Problem Relation Age of Onset    No Known Problems Father     Thyroid disease Mother         Hyperthyroidism     Depression Mother     No Known Problems Son     Heart Disorder Maternal Grandfather     Thyroid disease Maternal Grandfather     Cancer Paternal Grandmother 88        Leukemia    No Known Problems Sister       Social History:   Social History     Socioeconomic History    Marital status:    Tobacco Use    Smoking status: Never    Smokeless tobacco: Never   Vaping Use    Vaping status: Never Used   Substance and Sexual Activity    Alcohol use: Yes     Comment: minimal    Drug use: Never    Sexual activity: Yes     Partners: Male     Birth control/protection: Condom   Other Topics Concern    Caffeine Concern No    Exercise Yes    Seat Belt No    Special Diet No    Stress Concern No    Weight Concern Yes    Blood Transfusions No     Social Determinants of Health      Received from [facility]    Social Connections    Received from [facility]    Housing Stability        Medications (Active prior to today's visit):  Current Outpatient Medications   Medication Sig Dispense Refill    HYDROcodone-acetaminophen 5-325 MG Oral Tab Take 1 tablet by mouth every 6 (six) hours as needed for Pain.      ibuprofen 800 MG Oral Tab Take 1 tablet (800 mg total) by mouth every 6 (six) hours as  needed for Pain.      chlorhexidine gluconate 0.12 % Mouth/Throat Solution RINSE AND SPIT 15ML BY MOUTH TWICE DAILY      amoxicillin 500 MG Oral Cap Take 1 capsule (500 mg total) by mouth 3 (three) times daily.      hydrOXYzine 25 MG Oral Tab Take 1 tablet (25 mg total) by mouth as needed for Anxiety.      tretinoin 0.025 % External Cream       venlafaxine ER 75 MG Oral Capsule SR 24 Hr Take 2 capsules (150 mg total) by mouth daily. TAKE 1 CAPSULE BY MOUTH DAILY WITH FOOD. TAKE WITH THE 37.5MG CAPSULE FOR TOTAL DAILY DOSE .5MG      Multiple Vitamin (MULTI-DAY) Oral Tab Take by mouth every morning.         Allergies:  No Known Allergies    ROS:   CONSTITUTIONAL: negative for fevers, chills, sweats and weight loss  EYES Negative for red eyes, yellow eyes, changes in vision  HEENT: Negative for dysphagia and hoarseness  RESPIRATORY: Negative for cough and shortness of breath  CARDIOVASCULAR: Negative for chest pain, palpitations  GASTROINTESTINAL: See HPI  GENITOURINARY: Negative for dysuria and frequency  MUSCULOSKELETAL: Negative for arthralgias and myalgias  NEUROLOGICAL: Negative for dizziness and headaches  BEHAVIOR/PSYCH: Negative for anxiety and poor appetite    PHYSICAL EXAM:   Limited 2/2 telehealth    GEN: WD/WN, NAD    LUNGS: No increased work of breathing    NEURO: Alert and interactive, normal gait    Labs/Imaging/Procedures:     Patient's pertinent labs and imaging were reviewed and discussed with patient today.        .  ASSESSMENT/PLAN:   Lyudmila Epstein is a 43 year old year-old female with history of anxiety, depression, IBS, pupp:    #crc screening  Due age 46 unless otherwise indicated    #constipation  #bloating  #heartburn  Constipation has improved with citrucel.  Heartburn controlled with pepcid daily. On-going bloating w/o clear dietary trigger and improved with bm.  HP neg, celiac neg. She denies brbpr, melena, unintentional weight loss.  Not due for gyne exam until fall '24.   Recommendations below. Consider ct a/p for eval bloating if on-going symptoms.     -reflux diet modification  -pepcid  -squatty potty  -citrucel - consider switching to fibercon  -miralax (titrate to desired effect)  -gyne f/u  -low fodmap diet  -ctm for need for cross-sectional imaging    Orders This Visit:  No orders of the defined types were placed in this encounter.      Meds This Visit:  Requested Prescriptions      No prescriptions requested or ordered in this encounter       Imaging & Referrals:  None      Lurdes Wilson, APRN   5/20/2024        This note was partially prepared using Dragon Medical voice recognition dictation software. As a result, errors may occur. When identified, these errors have been corrected. While every attempt is made to correct errors during dictation, discrepancies may still exist.

## 2024-05-23 ENCOUNTER — TELEMEDICINE (OUTPATIENT)
Dept: GASTROENTEROLOGY | Facility: CLINIC | Age: 44
End: 2024-05-23

## 2024-05-23 DIAGNOSIS — K59.00 CONSTIPATION, UNSPECIFIED CONSTIPATION TYPE: Primary | ICD-10-CM

## 2024-05-23 DIAGNOSIS — Z12.11 COLON CANCER SCREENING: ICD-10-CM

## 2024-05-23 DIAGNOSIS — R12 HEARTBURN: ICD-10-CM

## 2024-05-23 DIAGNOSIS — R14.0 BLOATING: ICD-10-CM

## 2024-05-23 PROCEDURE — 99443 PHONE E/M BY PHYS 21-30 MIN: CPT | Performed by: NURSE PRACTITIONER

## 2024-05-23 NOTE — PATIENT INSTRUCTIONS
-reflux diet modification  -pepcid  -squatty potty  -citrucel - consider switching to fibercon  -miralax (titrate to desired effect)  -gyne f/u  -low fodmap diet  -ctm for need for cross-sectional imaging    Tips to Control Acid Reflux    To control acid reflux, you’ll need to make some basic diet and lifestyle changes. The simple steps outlined below may be all you’ll need to ease discomfort.   Watch what you eat  Don't have fatty foods or spicy foods.  Eat fewer acidic foods, such as citrus and tomato-based foods. These can increase symptoms.  Limit drinking alcohol, caffeine, and fizzy beverages. All increase acid reflux.  Try limiting chocolate, peppermint, and spearmint. These can make acid reflux worse in some people.     Watch when you eat  Don't lie down for 3 hours after eating.  Don't snack before going to bed.     Raise your head  Raising your head and upper body by 4 to 6 inches helps limit reflux when you’re lying down. Put blocks under the head of your bed frame or a wedge under your mattress to raise it.   Other changes  Lose weight, if you need to  Don’t exercise near bedtime  Don't wear tight-fitting clothes  Limit aspirin and ibuprofen  Stop smoking     [employer] last reviewed this educational content on 6/1/2019  © 4730-6581 [employer]. [address] [place] [address], [place] [address]. All rights reserved. This information is not intended as a substitute for professional medical care. Always follow your healthcare professional's instructions.

## 2024-07-18 ENCOUNTER — TELEPHONE (OUTPATIENT)
Facility: CLINIC | Age: 44
End: 2024-07-18

## 2024-07-18 NOTE — TELEPHONE ENCOUNTER
1st,overdue reminder letter mailed out to patient   Labs order   Orders Placed on 3/21/2024    Helicobacter Pylori Breath Test, Adult  Tai

## 2024-10-07 ENCOUNTER — OFFICE VISIT (OUTPATIENT)
Facility: CLINIC | Age: 44
End: 2024-10-07
Payer: COMMERCIAL

## 2024-10-07 ENCOUNTER — TELEPHONE (OUTPATIENT)
Facility: CLINIC | Age: 44
End: 2024-10-07

## 2024-10-07 VITALS — OXYGEN SATURATION: 98 % | HEART RATE: 77 BPM | SYSTOLIC BLOOD PRESSURE: 126 MMHG | DIASTOLIC BLOOD PRESSURE: 78 MMHG

## 2024-10-07 DIAGNOSIS — M25.561 ACUTE PAIN OF RIGHT KNEE: Primary | ICD-10-CM

## 2024-10-07 PROCEDURE — 99213 OFFICE O/P EST LOW 20 MIN: CPT | Performed by: FAMILY MEDICINE

## 2024-10-07 RX ORDER — NAPROXEN 500 MG/1
500 TABLET ORAL 2 TIMES DAILY WITH MEALS
Qty: 60 TABLET | Refills: 0 | Status: SHIPPED | OUTPATIENT
Start: 2024-10-07

## 2024-10-07 NOTE — PROGRESS NOTES
HPI:    Patient ID: Lyudmila Epstein is a 43 year old female who presents for right knee pain.    HPI  Right knee pain x10 days.  Unknown trigger. No specific trauma or injury.  Went to IC yesterday and had XR that indicated patella enedina.   Feels pain on medial aspect of knee.   No swelling or redness of skin.   No locking or catching.   Tylenol and ibuprofen don't help. Icing.   Aggravating factors: Prolonged standing. Stairs.   Injured right knee in college years ago. Was a patella injury that resolved with conservative management.   No current regular exercise.     Past Medical History:    Anxiety    Depression    IBS (irritable bowel syndrome)    Pneumonia due to organism    Bacterial    PUPP (pruritic urticarial papules and plaques of pregnancy) ([facility])    Visual impairment    Reading glasses        Current Outpatient Medications   Medication Sig Dispense Refill    FLUoxetine 20 MG Oral Cap Take 1 capsule (20 mg total) by mouth daily.      naproxen 500 MG Oral Tab Take 1 tablet (500 mg total) by mouth 2 (two) times daily with meals. 60 tablet 0    ibuprofen 800 MG Oral Tab Take 1 tablet (800 mg total) by mouth every 6 (six) hours as needed for Pain.      Multiple Vitamin (MULTI-DAY) Oral Tab Take by mouth every morning.          No Known Allergies    Review of Systems   Constitutional: Negative.    Musculoskeletal:  Positive for arthralgias. Negative for joint swelling.   Skin: Negative.             /78   Pulse 77   LMP 02/10/2024 (Exact Date)   SpO2 98%     PHYSICAL EXAM:   Physical Exam  Constitutional:       General: She is not in acute distress.     Appearance: Normal appearance. She is not ill-appearing, toxic-appearing or diaphoretic.   Pulmonary:      Effort: Pulmonary effort is normal.   Musculoskeletal:      Right knee: Deformity (high-riding patella) present. No swelling, effusion, erythema, ecchymosis, bony tenderness or crepitus. Normal range of motion. Tenderness present over the medial joint  line. No lateral joint line, MCL, LCL or patellar tendon tenderness. No LCL laxity, MCL laxity, ACL laxity or PCL laxity. Abnormal patellar mobility (high-riding. no abnormal movement). Normal meniscus. Normal pulse.      Instability Tests: Anterior drawer test negative. Posterior drawer test negative. Medial Eduardo test negative and lateral dEuardo test negative.   Neurological:      General: No focal deficit present.      Mental Status: She is alert.   Psychiatric:         Mood and Affect: Mood normal.         Behavior: Behavior normal.         Thought Content: Thought content normal.         Judgment: Judgment normal.             ASSESSMENT/PLAN:     Encounter Diagnosis   Name Primary?    Acute pain of right knee Yes       1. Acute pain of right knee  - Physical Therapy Referral - External     -Ddx includes patellofemoral pain syndrome.  -Reviewed conservative management in detail including RICE & NSAIDs prn. Will trial naproxen. To message if fails to help, and may then consider meloxicam.    -Discussed activity modification.   -Recommend PT. Referral generated.   -Plan for MRI if no/minimal improvement following PT.     Meds This Visit:  Requested Prescriptions     Signed Prescriptions Disp Refills    naproxen 500 MG Oral Tab 60 tablet 0     Sig: Take 1 tablet (500 mg total) by mouth 2 (two) times daily with meals.       Imaging & Referrals:  PHYSICAL THERAPY EXTERNAL       Anthony Singer, DO  ID#[id number]

## 2024-10-07 NOTE — TELEPHONE ENCOUNTER
Dr Velasco, please advise    Patient (name and  verified) calling for an MRI order of her left knee. Do you want to see patient in the office firs for follow up? If so can we use a SDA appt?    [facility] yesterday. Patient has left knee pain for several weeks. Patient states that she had an xray and they recommended an MRI.   Dx Patella enedina    Patient was instructed to have visit notes and xray results faxed to PCP office from the urgent care.

## 2024-10-07 NOTE — TELEPHONE ENCOUNTER
Please offer 10:30am today with Dr. Singer as she will need a visit to document need for imaging and/or determine next steps.

## 2024-10-17 ENCOUNTER — HOSPITAL ENCOUNTER (OUTPATIENT)
Dept: MAMMOGRAPHY | Facility: HOSPITAL | Age: 44
Discharge: HOME | End: 2024-10-17
Attending: SURGERY
Payer: COMMERCIAL

## 2024-10-17 DIAGNOSIS — N64.89 RADIAL SCAR OF RIGHT BREAST: ICD-10-CM

## 2024-10-17 PROCEDURE — 77066 DX MAMMO INCL CAD BI: CPT | Performed by: SURGERY

## 2024-10-17 PROCEDURE — 77062 BREAST TOMOSYNTHESIS BI: CPT | Performed by: SURGERY

## 2024-11-14 ENCOUNTER — OFFICE VISIT (OUTPATIENT)
Dept: OBGYN CLINIC | Facility: CLINIC | Age: 44
End: 2024-11-14
Payer: COMMERCIAL

## 2024-11-14 VITALS — HEIGHT: 67.5 IN | BODY MASS INDEX: 26 KG/M2 | DIASTOLIC BLOOD PRESSURE: 84 MMHG | SYSTOLIC BLOOD PRESSURE: 138 MMHG

## 2024-11-14 DIAGNOSIS — Z01.419 VISIT FOR GYNECOLOGIC EXAMINATION: Primary | ICD-10-CM

## 2024-11-14 PROCEDURE — 99396 PREV VISIT EST AGE 40-64: CPT | Performed by: OBSTETRICS & GYNECOLOGY

## 2024-11-14 RX ORDER — DULOXETIN HYDROCHLORIDE 60 MG/1
60 CAPSULE, DELAYED RELEASE ORAL DAILY
COMMUNITY
Start: 2024-11-07

## 2024-11-14 RX ORDER — VENLAFAXINE HYDROCHLORIDE 150 MG/1
CAPSULE, EXTENDED RELEASE ORAL
COMMUNITY
Start: 2024-07-16 | End: 2024-11-14 | Stop reason: ALTCHOICE

## 2024-11-14 RX ORDER — DULOXETIN HYDROCHLORIDE 30 MG/1
CAPSULE, DELAYED RELEASE ORAL
COMMUNITY
Start: 2024-10-03 | End: 2024-11-14 | Stop reason: DRUGHIGH

## 2024-11-14 NOTE — PROGRESS NOTES
ANNUAL GYN EXAM  [facility]    CHIEF COMPLAINT:    Chief Complaint   Patient presents with    Annual      HISTORY OF PRESENT ILLNESS:   Lyudmila Epstein is a 44 year old female   who presents for annual well woman visit.  She is feeling well.    Annual     Patient's last menstrual period was 10/16/2024 (approximate).  Monthly / regular    Mammogram was normal last month      Not frequent sex  No problems with sex  Using condoms.    Exercise: tries a few times per week  Diet: good  Mood: ok      PAST MEDICAL HISTORY:   Past Medical History:    Anxiety    Depression    IBS (irritable bowel syndrome)    Pneumonia due to organism    Bacterial    PUPP (pruritic urticarial papules and plaques of pregnancy) ([facility])    Visual impairment    Reading glasses        PAST SURGICAL HISTORY:   Past Surgical History:   Procedure Laterality Date          2018    Cyst aspiration left      Done at [facility] in [place]    Endometrial ablation  2022    Hc  section level i      Cailin localization wire 1 site right (cpt=19281)  2023    Needle biopsy right Right 10/19/2023    US bx      2018, 2013    [place] teeth removed          PAST OB HISTORY:  OB History    Para Term  AB Living   2 2 2     2   SAB IAB Ectopic Multiple Live Births           1      # Outcome Date GA Lbr Solo/2nd Weight Sex Type Anes PTL Lv   2 Term 18 39w0d  8 lb 11 oz (3.94 kg) M CS-LTranv Spinal  KRIS   1 Term 14   8 lb 6 oz (3.799 kg) M Vag-Forceps          CURRENT MEDICATIONS:      Current Outpatient Medications:     DULoxetine 60 MG Oral Cap DR Particles, Take 1 capsule (60 mg total) by mouth daily., Disp: , Rfl:     ibuprofen 800 MG Oral Tab, Take 1 tablet (800 mg total) by mouth every 6 (six) hours as needed for Pain., Disp: , Rfl:     Multiple Vitamin (MULTI-DAY) Oral Tab, Take by mouth every morning., Disp: , Rfl:     ALLERGIES:  Allergies[1]    SOCIAL HISTORY:  Social History      Socioeconomic History    Marital status:    Tobacco Use    Smoking status: Never    Smokeless tobacco: Never   Vaping Use    Vaping status: Never Used   Substance and Sexual Activity    Alcohol use: Yes     Comment: couple times per week    Drug use: Never    Sexual activity: Yes     Partners: Male     Birth control/protection: Condom   Other Topics Concern    Caffeine Concern No    Exercise Yes    Seat Belt No    Special Diet No    Stress Concern No    Weight Concern Yes    Blood Transfusions No       FAMILY HISTORY:  Family History   Problem Relation Age of Onset    Breast Cancer Mother 70    Thyroid disease Mother         Hyperthyroidism     Depression Mother     No Known Problems Father     No Known Problems Sister     No Known Problems Son     Heart Disorder Maternal Grandfather     Thyroid disease Maternal Grandfather     Cancer Paternal Grandmother 88        Leukemia     ASSESSMENTS:  REVIEW OF SYSTEMS:  CONSTITUTIONAL:  negative for fevers, chills and sweats    EYES:  negative for  blurred vision and visual disturbance  RESPIRATORY:  negative for  cough and shortness of breath  CARDIOVASCULAR:  negative for  chest pain, palpitations  GASTROINTESTINAL:  No constipation/diarrhea, no pain  GENITOURINARY:  See History of Present Illness  INTEGUMENT/BREAST: Breast: no masses, no nipple discharge  ENDOCRINE:  negative for acne, constipation, diarrhea, cold intolerance, heat intolerance, fatigue, hair loss, weight gain and weight loss  MUSCULOSKELETAL:  negative for joint pain  NEUROLOGICAL:  negative for dizziness/lightheadedness and headaches  BEHAVIOR/PSYCH:  Negative for depressed mood, anhedonia and anxiety    PHYSICAL EXAM  Patient's last menstrual period was 10/16/2024 (approximate).   Vitals:    11/14/24 1231   BP: 138/84   Height: 67.5\"       CONSTITUTIONAL: Awake, alert, cooperative, no apparent distress, and appears stated age   NECK: Supple, symmetrical, trachea midline, no adenopathy,  thyroid symmetric, not enlarged and no tenderness  LUNGS: no excess work of breathing  ABDOMEN: Soft, non-distended, non-tender, no masses palpated    CHEST/BREASTS: Breasts symmetrical, skin without lesion(s), no nipple retraction or dimpling, no nipple discharge, no masses palpated, no axillary or supraclavicular adenopathy  GENITAL/URINARY:    External Genitalia:  General appearance; normal, Hair distribution; normal, Lesions absent   Urethral Meatus:  Lesions absent, Prolapse absent  Bladder:  Tenderness absent, Cystocele absent  Vagina:  Discharge absent, Lesions absent, Pelvic support normal  Cervix:  Lesions absent, Discharge absent, Tenderness absent  Uterus:  Size normal, Masses absent, Tenderness absent  Adnexa:  Masses absent, Tenderness absent  Anus/Perineum:  Lesions absent    MUSCULOSKELETAL: There is no redness, warmth, or swelling of the joints.  Tone is normal.  NEUROLOGIC: Patient is awake, alert and oriented to name, place and time. Casual gait is normal.  SKIN: no bruising or bleeding and no rashes  PSYCHIATRIC: Behavior:  Appropriate  Mood:  appropriate  ASSESSMENT AND PLAN:  1. Visit for gynecologic examination  - CBE and pelvic exam today. Self breast awareness discussed.  follow up 1 yr or as needed  Aylin Griggs DO         [1] No Known Allergies

## 2024-12-30 ENCOUNTER — TELEPHONE (OUTPATIENT)
Facility: CLINIC | Age: 44
End: 2024-12-30

## 2024-12-30 NOTE — TELEPHONE ENCOUNTER
Pt stated she was seen at  12/21/24- Dx Bronchitis- cough,low grade fever  Prescribed albuterol inhaler,CXR done, neg COVID/flu   After 9 days not better- still coughing , can feel congestion in chest, taking Mucinex , drinking lots fluids, poor appetite, very fatigue-taking nape -tired from cough during night     Requesting further advised - advised hydration,steam treatment

## 2024-12-30 NOTE — TELEPHONE ENCOUNTER
Concern for possible atypical (walking pneumonia). Can prescribe Azithromycin or have her come in for a visit first.

## 2024-12-31 ENCOUNTER — TELEPHONE (OUTPATIENT)
Facility: CLINIC | Age: 44
End: 2024-12-31

## 2024-12-31 RX ORDER — AZITHROMYCIN 250 MG/1
TABLET, FILM COATED ORAL
Qty: 6 TABLET | Refills: 0 | Status: SHIPPED | OUTPATIENT
Start: 2024-12-31 | End: 2025-01-05

## 2024-12-31 NOTE — TELEPHONE ENCOUNTER
PharmacistBandar  stated pt is allergic  to medication  azithromax-   Stated Microlides is on their Allergy list     Pharmacy open til 5

## 2024-12-31 NOTE — TELEPHONE ENCOUNTER
Left detailed message (per WEST) on patient's identified voicemail.   Stated will also send MyChart message, left call back number and hours for any questions or problems.

## 2024-12-31 NOTE — TELEPHONE ENCOUNTER
Spoke with patient, Date of Birth verified  She was informed of  MD recommendation.  She stated she already got her zpack, she mentioned long time ago she had rash from zpack but she is not allergic to meds.   Also she requested an appt early next week, can we use SDA?   pls advise, thanks in advance.

## 2024-12-31 NOTE — TELEPHONE ENCOUNTER
Please verify this with patient as she agreed to Azithromycin and it is not listed on her allergy list.

## 2024-12-31 NOTE — TELEPHONE ENCOUNTER
Patient returned our call ( Identified name and date of birth )     Informed of Dr. Velasco'e message below    Patient would prefer to try  the Zpack  first      Allergies reviewed and pharmacy confirmed    Medication pended for your review / approval      Please advise and thank you.

## 2024-12-31 NOTE — TELEPHONE ENCOUNTER
Patient was advised Dr. Velasco's message and assisted with scheduling an appointment    Future Appointments   Date Time Provider Department Center   1/7/2025  1:30 PM Nazia Velasco DO [facility]   1/9/2025  5:00 PM Zehra Way MD [facility]   1/29/2025  8:15 AM Ester Fisher MD [facility]

## 2025-01-07 ENCOUNTER — OFFICE VISIT (OUTPATIENT)
Facility: CLINIC | Age: 45
End: 2025-01-07
Payer: COMMERCIAL

## 2025-01-07 VITALS
HEIGHT: 67.5 IN | HEART RATE: 106 BPM | OXYGEN SATURATION: 98 % | BODY MASS INDEX: 26.37 KG/M2 | SYSTOLIC BLOOD PRESSURE: 132 MMHG | DIASTOLIC BLOOD PRESSURE: 84 MMHG | WEIGHT: 170 LBS

## 2025-01-07 DIAGNOSIS — J18.9 ATYPICAL PNEUMONIA: Primary | ICD-10-CM

## 2025-01-07 PROCEDURE — 99213 OFFICE O/P EST LOW 20 MIN: CPT | Performed by: FAMILY MEDICINE

## 2025-01-07 RX ORDER — GUAIFENESIN 600 MG/1
1200 TABLET, EXTENDED RELEASE ORAL 2 TIMES DAILY
COMMUNITY

## 2025-01-07 NOTE — PROGRESS NOTES
CC:    Chief Complaint   Patient presents with    Follow - Up       HPI: 44 year old female here for follow-up on respiratory illness.  Was seen at [facility] on 12/21 and was diagnosed with bronchitis.  She had a cough and low grade fever at the time, and was given an Albuterol inhaler and Benzonatate, but that was not helping.   After nine days she was having a significant cough still and feeling more short of breath and with wheezing.  She was also very fatigued and had a decreased appetite.   Started on Azithromycin on 12/31, and started feeling better on day 5.  Her congestion and cough have improved, and her energy is much better along with her appetite.     ROS:  General:  No fevers/chills, improved fatigue, no weight changes  HEENT:  Denies congestion or nasal discharge  Cardio:  No chest pain  Pulmonary: Improved cough, no shortness of breath or wheezing   GI:  No N/V/D, no abdominal pain   Dermatologic:  No rashes    Past Medical History:    Anxiety    Depression    IBS (irritable bowel syndrome)    Pneumonia due to organism    Bacterial    PUPP (pruritic urticarial papules and plaques of pregnancy) ([facility])    Visual impairment    Reading glasses       Social History     Socioeconomic History    Marital status:      Spouse name: Not on file    Number of children: Not on file    Years of education: Not on file    Highest education level: Not on file   Occupational History    Not on file   Tobacco Use    Smoking status: Never    Smokeless tobacco: Never   Vaping Use    Vaping status: Never Used   Substance and Sexual Activity    Alcohol use: Yes     Comment: couple times per week    Drug use: Never    Sexual activity: Yes     Partners: Male     Birth control/protection: Condom   Other Topics Concern    Caffeine Concern No    Exercise Yes    Seat Belt No    Special Diet No    Stress Concern No    Weight Concern Yes     Service Not Asked    Blood Transfusions No    Occupational  Exposure Not Asked    Hobby Hazards Not Asked    Sleep Concern Not Asked    Back Care Not Asked    Bike Helmet Not Asked    Self-Exams Not Asked   Social History Narrative    Not on file     Social Drivers of Health     Financial Resource Strain: Not on file   Food Insecurity: Not on file   Transportation Needs: Not on file   Physical Activity: Not on file   Stress: Not on file   Social Connections: Unknown (6/14/2021)    [facility]    Social Connections     Conversations with friends/family/neighbors per week: Not on file   Housing Stability: Low Risk  (7/12/2022)    [facility]    Housing Stability     Mortgage Payment Concerns?: Not on file     Number of Places Lived in the Last Year: Not on file     Unstable Housing?: Not on file       Current Outpatient Medications   Medication Sig Dispense Refill    guaiFENesin ER (MUCINEX) 600 MG Oral Tablet 12 Hr Take 2 tablets (1,200 mg total) by mouth 2 (two) times daily.      DULoxetine 60 MG Oral Cap DR Particles Take 1 capsule (60 mg total) by mouth daily.      ibuprofen 800 MG Oral Tab Take 1 tablet (800 mg total) by mouth every 6 (six) hours as needed for Pain.      Multiple Vitamin (MULTI-DAY) Oral Tab Take by mouth every morning.         Patient has no known allergies.      Vitals:   Vitals:    01/07/25 1323   BP: 132/84   Pulse: 106   SpO2: 98%   Weight: 170 lb (77.1 kg)   Height: 5' 7.5\" (1.715 m)       Body mass index is 26.23 kg/m².    Physical:  General:  Alert, appropriate, no acute distress   HEENT: supple, no lymphadenopathy   Cardio:  RRR, no murmurs, S1, S2  Pulmonary:  Clear bilaterally, good air entry, no wheezing, no crackles, no rhonchi   Dermatologic:  No rashes or lesions    Assessment and Plan: 44-year-old female here for follow-up on likely atypical pneumonia.    1. Atypical pneumonia    - Doing much better after  completing azithromycin with no concerning findings on lung exam today  - Continue supportive care, and notify me if symptoms worsen over the next few weeks      Nazia Velasco DO  01/07/25  1:47 PM

## 2025-04-02 ENCOUNTER — HOSPITAL ENCOUNTER (OUTPATIENT)
Age: 45
Discharge: HOME | End: 2025-04-02
Payer: COMMERCIAL

## 2025-04-02 VITALS
DIASTOLIC BLOOD PRESSURE: 74 MMHG | RESPIRATION RATE: 20 BRPM | TEMPERATURE: 99 F | OXYGEN SATURATION: 98 % | SYSTOLIC BLOOD PRESSURE: 155 MMHG | HEART RATE: 120 BPM

## 2025-04-02 DIAGNOSIS — H66.91 RIGHT ACUTE OTITIS MEDIA: Primary | ICD-10-CM

## 2025-04-02 PROCEDURE — 99213 OFFICE O/P EST LOW 20 MIN: CPT | Performed by: NURSE PRACTITIONER

## 2025-04-02 RX ORDER — AMOXICILLIN 875 MG/1
875 TABLET, COATED ORAL 2 TIMES DAILY
Qty: 14 TABLET | Refills: 0 | Status: SHIPPED | OUTPATIENT
Start: 2025-04-02 | End: 2025-04-09

## 2025-04-02 RX ORDER — NAPROXEN 500 MG/1
500 TABLET ORAL 2 TIMES DAILY PRN
Qty: 20 TABLET | Refills: 0 | Status: SHIPPED | OUTPATIENT
Start: 2025-04-02 | End: 2025-04-12

## 2025-04-02 NOTE — ED INITIAL ASSESSMENT (HPI)
Pt presents with sinus congestion x 7 days. Right ear pain and pressure x 2 days. Fatigue.     Home Covid Antigen Test negative yesterday.

## 2025-04-02 NOTE — ED PROVIDER NOTES
Patient Seen in: [facility]      History     Chief Complaint   Patient presents with    Ear Pain    Sinus Problem     Stated Complaint: Ear Pain    Subjective:   HPI  Patient is a 44-year-old female who presents to the immediate care center with a concern for pain to the right ear that has been persistent for 2 days and worsening.  She endorses muffled hearing during this time.  She has had nasal congestion has been constant and persistent for 1 week.  For this, she has been using over-the-counter nasal corticosteroid, phenylephrine, and Mucinex without relief.            Objective:     Past Medical History:    Anxiety    Depression    IBS (irritable bowel syndrome)    Pneumonia due to organism    Bacterial    PUPP (pruritic urticarial papules and plaques of pregnancy) ([facility])    Visual impairment    Reading glasses              Past Surgical History:   Procedure Laterality Date          2018    Cyst aspiration left      Done at [facility] in [place]    Endometrial ablation  2022    Hc  section level i      Cailin localization wire 1 site right (cpt=19281)  2023    Needle biopsy right Right 10/19/2023    US bx      2018, 2013    [place] teeth removed                  Social History     Socioeconomic History    Marital status:    Tobacco Use    Smoking status: Never    Smokeless tobacco: Never   Vaping Use    Vaping status: Never Used   Substance and Sexual Activity    Alcohol use: Yes     Comment: couple times per week    Drug use: Never    Sexual activity: Yes     Partners: Male     Birth control/protection: Condom   Other Topics Concern    Caffeine Concern No    Exercise Yes    Seat Belt No    Special Diet No    Stress Concern No    Weight Concern Yes    Blood Transfusions No     Social Drivers of Health      [facility]    Housing Stability              Review of Systems    Constitutional:  Negative for appetite change, chills and fever.   HENT:  Positive for congestion, ear pain, hearing loss (muffled) and sinus pressure.    Respiratory:  Negative for cough.    Gastrointestinal:  Negative for abdominal pain.   Musculoskeletal:  Negative for arthralgias and myalgias.   Skin:  Negative for rash.   Neurological:  Negative for dizziness, weakness and headaches.       Positive for stated complaint: Ear Pain  Other systems are as noted in HPI.  Constitutional and vital signs reviewed.      All other systems reviewed and negative except as noted above.    Physical Exam     ED Triage Vitals [04/02/25 0819]   /74   Pulse 120   Resp 20   Temp 98.5 °F (36.9 °C)   Temp src Oral   SpO2 98 %   O2 Device None (Room air)       Current Vitals:   Vital Signs  BP: 155/74  Pulse: 120  Resp: 20  Temp: 98.5 °F (36.9 °C)  Temp src: Oral    Oxygen Therapy  SpO2: 98 %  O2 Device: None (Room air)        Physical Exam  Vitals and nursing note reviewed.   Constitutional:       General: She is not in acute distress.     Appearance: She is not ill-appearing.   HENT:      Right Ear: Tympanic membrane is erythematous and bulging. Tympanic membrane is not perforated.      Nose: Nose normal.   Eyes:      Conjunctiva/sclera: Conjunctivae normal.   Pulmonary:      Effort: Pulmonary effort is normal. No respiratory distress.   Musculoskeletal:      Cervical back: Normal range of motion and neck supple.   Neurological:      Mental Status: She is alert and oriented to person, place, and time.   Psychiatric:         Behavior: Behavior normal.             ED Course   Labs Reviewed - No data to display                MDM      Patient will use prescribed amoxicillin and naproxen management of her ear symptoms.  She was encouraged to continue the use of her nasal corticosteroid, but also add pseudoephedrine.  She will follow with primary care provider next week if symptoms continue or if it anytime symptoms  worsen.            Medical Decision Making  Differential diagnoses considered included, but are not exclusive of: bacterial vs viral sinusitis, dehydration, pneumonia, influenza, Covid-19 infection, and other viral upper respiratory infection.     Differential diagnoses considered include, but are not exclusive of: Acute otitis media, suppurative; acute otitis externa; acute otitis media, serous; ruptured tympanic membrane; mastoiditis.      Problems Addressed:  Right acute otitis media: self-limited or minor problem    Risk  OTC drugs.  Prescription drug management.        Disposition and Plan     Clinical Impression:  1. Right acute otitis media         Disposition:  Discharge  4/2/2025  8:42 am    Follow-up:  Nazia Velasco DO  [address] [place] [address]  [phone]    Schedule an appointment as soon as possible for a visit   As needed          Medications Prescribed:  Current Discharge Medication List        START taking these medications    Details   naproxen 500 MG Oral Tab Take 1 tablet (500 mg total) by mouth 2 (two) times daily as needed.  Qty: 20 tablet, Refills: 0      amoxicillin 875 MG Oral Tab Take 1 tablet (875 mg total) by mouth 2 (two) times daily for 7 days.  Qty: 14 tablet, Refills: 0                 Supplementary Documentation:

## 2025-04-07 ENCOUNTER — HOSPITAL ENCOUNTER (OUTPATIENT)
Age: 45
Discharge: HOME | End: 2025-04-07
Payer: COMMERCIAL

## 2025-04-07 ENCOUNTER — TELEPHONE (OUTPATIENT)
Facility: CLINIC | Age: 45
End: 2025-04-07

## 2025-04-07 VITALS
HEART RATE: 86 BPM | RESPIRATION RATE: 20 BRPM | TEMPERATURE: 98 F | SYSTOLIC BLOOD PRESSURE: 145 MMHG | DIASTOLIC BLOOD PRESSURE: 89 MMHG | OXYGEN SATURATION: 99 %

## 2025-04-07 DIAGNOSIS — H66.91 OM (OTITIS MEDIA), RECURRENT, RIGHT: Primary | ICD-10-CM

## 2025-04-07 PROCEDURE — 99213 OFFICE O/P EST LOW 20 MIN: CPT | Performed by: NURSE PRACTITIONER

## 2025-04-07 NOTE — ED INITIAL ASSESSMENT (HPI)
Pt presents with right ear pain x 7 days, Pt seen at [facility] on 4/2/25, treated with Amoxicillin, on day 6 of antibiotics. Pt also taking Sudafed.     Pt reports partial hearing loss to right ear and continued severe pain.

## 2025-04-07 NOTE — TELEPHONE ENCOUNTER
Patient called in to see if there were any cancellations. Advised none at this time. Patient is on wait list, she states she may go to immediate care but asked nurse to keep visit as scheduled tomorrow and not to cancel for now.

## 2025-04-07 NOTE — TELEPHONE ENCOUNTER
Patient states that she went to urgent care on 4/2/25 for ear pain and sinus congestion. She was prescribed amoxicillin and her ear is still clogged and she has some muffled hearing. She has one more day of antibiotics left. Appointment made for tomorrow.     Future Appointments   Date Time Provider Department Center   4/8/2025  1:30 PM Anthony Singer DO [facility] FP [facility]   4/9/2025  8:30 AM Ester Fisher MD [facility]

## 2025-04-07 NOTE — ED PROVIDER NOTES
Patient Seen in: [facility]      History     Chief Complaint   Patient presents with    Ear Problem     Ear infection not going away, was at the same urgent care last week - Entered by patient     Stated Complaint: Ear Problem - Ear infection not going away, was at the same urgent care last we*    Subjective:   44-year-old female with medical conditions as noted below presents with complaints of continued right ear pain with muffled hearing.  Was seen here 25 for ear pain and sinus congestion.  Was diagnosed with a right ear infection and given Rx for amoxicillin.  Patient is on day 6 of the antibiotics and states has little improvement.  Has been taking Sudafed and naproxen.  No fever, ear drainage, headache, dizziness, tinnitus                Objective:     Past Medical History:    Anxiety    Depression    IBS (irritable bowel syndrome)    Pneumonia due to organism    Bacterial    PUPP (pruritic urticarial papules and plaques of pregnancy) ([facility])    Visual impairment    Reading glasses              Past Surgical History:   Procedure Laterality Date          2018    Cyst aspiration left      Done at [facility] in [place]    Endometrial ablation  2022    Hc  section level i      Cailin localization wire 1 site right (cpt=19281)  2023    Needle biopsy right Right 10/19/2023    US bx      2018, 2013    [place] teeth removed                  Social History     Socioeconomic History    Marital status:    Tobacco Use    Smoking status: Never    Smokeless tobacco: Never   Vaping Use    Vaping status: Never Used   Substance and Sexual Activity    Alcohol use: Yes     Comment: couple times per week    Drug use: Never    Sexual activity: Yes     Partners: Male     Birth control/protection: Condom   Other Topics Concern    Caffeine Concern No    Exercise Yes    Seat Belt No    Special Diet No    Stress Concern No    Weight Concern Yes    Blood Transfusions  No     Social Drivers of Health      [facility]    Housing Stability              Review of Systems   Constitutional:  Negative for chills and fever.   HENT:  Positive for congestion and ear pain. Negative for sore throat.    Gastrointestinal:  Negative for abdominal pain, diarrhea, nausea and vomiting.   Neurological:  Negative for dizziness, light-headedness and headaches.   All other systems reviewed and are negative.      Positive for stated complaint: Ear Problem - Ear infection not going away, was at the same urgent care last we*  Other systems are as noted in HPI.  Constitutional and vital signs reviewed.      All other systems reviewed and negative except as noted above.    Physical Exam     ED Triage Vitals [04/07/25 1312]   /89   Pulse 86   Resp 20   Temp 98 °F (36.7 °C)   Temp src Oral   SpO2 99 %   O2 Device None (Room air)       Current Vitals:   Vital Signs  BP: 145/89  Pulse: 86  Resp: 20  Temp: 98 °F (36.7 °C)  Temp src: Oral    Oxygen Therapy  SpO2: 99 %  O2 Device: None (Room air)        Physical Exam  Vitals and nursing note reviewed.   Constitutional:       General: She is not in acute distress.     Appearance: Normal appearance. She is not ill-appearing or toxic-appearing.   HENT:      Head: Normocephalic.      Right Ear: External ear normal. Tympanic membrane is erythematous and bulging.      Left Ear: Tympanic membrane and external ear normal.      Nose: Nose normal.      Mouth/Throat:      Mouth: Mucous membranes are moist.   Cardiovascular:      Rate and Rhythm: Normal rate and regular rhythm.   Pulmonary:      Effort: Pulmonary effort is normal.      Breath sounds: Normal breath sounds.   Skin:     General: Skin is warm and dry.   Neurological:      Mental Status: She is alert and oriented to person, place, and time.   Psychiatric:         Behavior: Behavior is cooperative.             ED Course   Labs Reviewed - No  data to display                MDM              Medical Decision Making  Patient is well-appearing.  I discussed differentials with patient including but not limited to otalgia from nasal congestion, recurrent otitis media  Right TM bulging with erythema  RX Augmentin  Continue use of Sudafed and naproxen as needed  Fu with ENT.  ED precautions discussed      Problems Addressed:  OM (otitis media), recurrent, right: acute illness or injury    Risk  Prescription drug management.        Disposition and Plan     Clinical Impression:  1. OM (otitis media), recurrent, right         Disposition:  Discharge  4/7/2025  1:39 pm    Follow-up:  Courtney Roberson MD  [address] [place] [address]  [phone]    Schedule an appointment as soon as possible for a visit             Medications Prescribed:  Discharge Medication List as of 4/7/2025  1:42 PM        START taking these medications    Details   amoxicillin clavulanate 875-125 MG Oral Tab Take 1 tablet by mouth every 12 (twelve) hours for 10 days., Normal, Disp-20 tablet, R-0                 Supplementary Documentation:

## 2025-04-08 ENCOUNTER — NURSE TRIAGE (OUTPATIENT)
Facility: CLINIC | Age: 45
End: 2025-04-08

## 2025-04-08 NOTE — TELEPHONE ENCOUNTER
Action Requested: Summary for Provider     []  Critical Lab, Recommendations Needed  [] Need Additional Advice  []   FYI    []   Need Orders  [] Need Medications Sent to Pharmacy  []  Other     SUMMARY: Disposition per protocol  is to home care.  Patient will follow up with Dr Russell as scheduled:  Future Appointments   Date Time Provider Department Center   4/14/2025  6:30 PM eRgis Russell MD [facility]     Reason for call: Fever  Onset:  April 1   Patient calling,verified name and date of birth.   Face has been flushed since ear infection started approximatley April 1. She has been seen in urgent care twice and id surrently on Augmentin. Denies hives, rash, itching, swelling in face or tongue, difficulty swallowing, stridor or shortness of breath. She feels feverish  in her face but temperature checks are normal. Denies chills. Reviewed care advice to try cool/tepid compress to face as needed, call back if signs of allergic reaction or fever or chills occur. Patient verbalizes understanding and agrees to plan of care.    Reason for Disposition   Fever with no signs of serious infection or localizing symptoms    Protocols used: Fever-A-OH

## 2025-04-09 ENCOUNTER — OFFICE VISIT (OUTPATIENT)
Facility: CLINIC | Age: 45
End: 2025-04-09
Payer: COMMERCIAL

## 2025-04-09 VITALS
WEIGHT: 160 LBS | RESPIRATION RATE: 16 BRPM | HEART RATE: 102 BPM | TEMPERATURE: 97 F | BODY MASS INDEX: 25 KG/M2 | OXYGEN SATURATION: 95 % | DIASTOLIC BLOOD PRESSURE: 94 MMHG | SYSTOLIC BLOOD PRESSURE: 158 MMHG

## 2025-04-09 DIAGNOSIS — Z12.31 SCREENING MAMMOGRAM, ENCOUNTER FOR: ICD-10-CM

## 2025-04-09 DIAGNOSIS — N64.89 RADIAL SCAR OF RIGHT BREAST: Primary | ICD-10-CM

## 2025-04-09 PROCEDURE — 99213 OFFICE O/P EST LOW 20 MIN: CPT | Performed by: SURGERY

## 2025-04-11 ENCOUNTER — TELEPHONE (OUTPATIENT)
Facility: CLINIC | Age: 45
End: 2025-04-11

## 2025-04-11 NOTE — TELEPHONE ENCOUNTER
Agree with recommendations and no need to stop antibiotic unless having any other concerning symptoms with it.

## 2025-04-11 NOTE — TELEPHONE ENCOUNTER
Spoke to patient (verified Name and ) and relayed provider's message below. Patient verbalized understanding and had no further questions or concerns at this time. Appreciative of the followup.

## 2025-04-11 NOTE — TELEPHONE ENCOUNTER
Dr Velasco,    Please advise. Thank you    Patient calling (verified full name and date of birth).  Patient stated that she has been having daily facial \" flushing\" for past 2 weeks while being on prior prescription or Amoxicillin and current prescription of Augmentin  Has 5 days left to take current prescription of Augmentin  Informed patient that flushing is listed as  potential side effect of the medication  Denied any anaphylactic symptoms or any other side effects or symptoms  Advised if develops worsening of flushing to call back or go to UC  If develops shortness of breath or anaphylactic symptoms to go to ED at once  Patient voiced understanding

## 2025-04-14 ENCOUNTER — OFFICE VISIT (OUTPATIENT)
Dept: OTOLARYNGOLOGY | Facility: CLINIC | Age: 45
End: 2025-04-14

## 2025-04-14 VITALS — WEIGHT: 160 LBS | HEIGHT: 67 IN | BODY MASS INDEX: 25.11 KG/M2

## 2025-04-14 DIAGNOSIS — H91.91 HEARING LOSS OF RIGHT EAR, UNSPECIFIED HEARING LOSS TYPE: Primary | ICD-10-CM

## 2025-04-14 RX ORDER — HYDROCODONE BITARTRATE AND ACETAMINOPHEN 10; 325 MG/1; MG/1
1 TABLET ORAL
COMMUNITY
Start: 2025-01-09

## 2025-04-14 RX ORDER — PREDNISONE 10 MG/1
TABLET ORAL
Qty: 44 TABLET | Refills: 0 | Status: SHIPPED | OUTPATIENT
Start: 2025-04-14

## 2025-04-15 ENCOUNTER — OFFICE VISIT (OUTPATIENT)
Dept: OTOLARYNGOLOGY | Facility: CLINIC | Age: 45
End: 2025-04-15

## 2025-04-15 ENCOUNTER — OFFICE VISIT (OUTPATIENT)
Dept: AUDIOLOGY | Facility: CLINIC | Age: 45
End: 2025-04-15

## 2025-04-15 DIAGNOSIS — H90.71 MIXED CONDUCTIVE AND SENSORINEURAL HEARING LOSS OF RIGHT EAR WITH UNRESTRICTED HEARING OF LEFT EAR: Primary | ICD-10-CM

## 2025-04-15 DIAGNOSIS — H91.90 HEARING LOSS, UNSPECIFIED HEARING LOSS TYPE, UNSPECIFIED LATERALITY: Primary | ICD-10-CM

## 2025-04-15 DIAGNOSIS — H91.91 HEARING LOSS OF RIGHT EAR, UNSPECIFIED HEARING LOSS TYPE: ICD-10-CM

## 2025-04-15 PROCEDURE — 92557 COMPREHENSIVE HEARING TEST: CPT | Performed by: AUDIOLOGIST

## 2025-04-15 PROCEDURE — 99213 OFFICE O/P EST LOW 20 MIN: CPT | Performed by: OTOLARYNGOLOGY

## 2025-04-15 PROCEDURE — 92567 TYMPANOMETRY: CPT | Performed by: AUDIOLOGIST

## 2025-04-15 NOTE — PROGRESS NOTES
Lyudmila Epstein is a 44 year old female.    Chief Complaint   Patient presents with    Ear Problem     Patient here for right ear infection f/up from  on 04/07/2025, reports hearing loss and ringing. Pt is now taking antibiotics, reports no improvement.       HISTORY OF PRESENT ILLNESS  She presents with history of developing a cold little over 2 weeks ago which subsequently went on to become a right ear infection and sinusitis.  Initially treated with 7 days of amoxicillin which did not really help completely resolve her sinusitis but did not do much at all for her ear.  Seen once again in immediate care and started on Augmentin for 10 days and she has a few days left of this medication.  Still notes no real change in her ear symptoms on the right but notes that her sinusitis is essentially resolved.  Using Sudafed twice daily and fluticasone twice daily.  Very concerned about hearing loss.  Feels that her hearing is down she has ringing and muffling of sound on that side to note dizziness no other significant signs, symptoms or complaints.      Social Hx on file[1]    Family History[2]    Past Medical History[3]    Past Surgical History[4]      REVIEW OF SYSTEMS    System Neg/Pos Details   Constitutional Negative Fatigue, fever and weight loss.   ENMT Negative Drooling.   Eyes Negative Blurred vision and vision changes.   Respiratory Negative Dyspnea and wheezing.   Cardio Negative Chest pain, irregular heartbeat/palpitations and syncope.   GI Negative Abdominal pain and diarrhea.   Endocrine Negative Cold intolerance and heat intolerance.   Neuro Negative Tremors.   Psych Negative Anxiety and depression.   Integumentary Negative Frequent skin infections, pigment change and rash.   Hema/Lymph Negative Easy bleeding and easy bruising.           PHYSICAL EXAM    Ht 5' 7\" (1.702 m)   Wt 160 lb (72.6 kg)   LMP 03/19/2025 (Approximate)   BMI 25.06 kg/m²        Constitutional Normal Overall appearance - Normal.    Psychiatric Normal Orientation - Oriented to time, place, person & situation. Appropriate mood and affect.   Neck Exam Normal Inspection - Normal. Palpation - Normal. Parotid gland - Normal. Thyroid gland - Normal.   Eyes Normal Conjunctiva - Right: Normal, Left: Normal. Pupil - Right: Normal, Left: Normal. Fundus - Right: Normal, Left: Normal.   Neurological Normal Memory - Normal. Cranial nerves - Cranial nerves II through XII grossly intact.   Head/Face Normal Facial features - Normal. Eyebrows - Normal. Skull - Normal.        Nasopharynx Normal External nose - Normal. Lips/teeth/gums - Normal. Tonsils - Normal. Oropharynx - Normal.   Ears Normal Inspection - Right: Normal, Left: Normal. Canal - Right: Normal, Left: Normal. TM - Right: Debris on tympanic membrane.  Middle ear fluid?  Left: Normal.   Skin Normal Inspection - Normal.        Lymph Detail Normal Submental. Submandibular. Anterior cervical. Posterior cervical. Supraclavicular.        Nose/Mouth/Throat Normal External nose - Normal. Lips/teeth/gums - Normal. Tonsils - Normal. Oropharynx - Normal.   Nose/Mouth/Throat Normal Nares - Right: Normal Left: Normal. Septum -Normal  Turbinates - Right: Normal, Left: Normal.     Medications - Current[5]  ASSESSMENT AND PLAN    1. Hearing loss of right ear, unspecified hearing loss type  Cannot really hear very well out of her right ear.  The ear popped but she did not notice any bubbling.  I am concerned that she may have an underlying sudden onset hearing loss of viral etiology and have asked her to start prednisone burst and taper continue with Sudafed twice daily and fluticasone twice daily.  She should return to my office tomorrow for baseline hearing test.  She agrees with this plan        This note was prepared using Dragon [employer] voice recognition dictation software. As a result errors may occur. When identified these errors have been corrected. While every attempt is made to correct errors during  dictation discrepancies may still exist    Regis Russell MD    2025    7:08 PM         [1]   Social History  Socioeconomic History    Marital status:    Tobacco Use    Smoking status: Never    Smokeless tobacco: Never   Vaping Use    Vaping status: Never Used   Substance and Sexual Activity    Alcohol use: Yes     Comment: couple times per week    Drug use: Never    Sexual activity: Yes     Partners: Male     Birth control/protection: Condom   Other Topics Concern    Caffeine Concern No    Exercise Yes    Seat Belt No    Special Diet No    Stress Concern No    Weight Concern Yes    Blood Transfusions No   [2]   Family History  Problem Relation Age of Onset    Breast Cancer Mother 70    Thyroid disease Mother         Hyperthyroidism     Depression Mother     No Known Problems Father     No Known Problems Sister     No Known Problems Son     Heart Disorder Maternal Grandfather     Thyroid disease Maternal Grandfather     Cancer Paternal Grandmother 88        Leukemia   [3]   Past Medical History:   Anxiety    Depression    IBS (irritable bowel syndrome)    Pneumonia due to organism    Bacterial    PUPP (pruritic urticarial papules and plaques of pregnancy) ([facility])    Visual impairment    Reading glasses   [4]   Past Surgical History:  Procedure Laterality Date              Cyst aspiration left      Done at [facility] in [place]    Endometrial ablation  2022    Hc  section level i      Cailin localization wire 1 site right (cpt=19281)  2023    Needle biopsy right Right 10/19/2023    US bx      2018, 2013    [place] teeth removed     [5]   Current Outpatient Medications:     predniSONE 10 MG Oral Tab, 6 tablets daily day one through 5, 4 tablets daily on days  6 and 7, 2 tablets daily on days 8 and 9, One tablet daily on days 10 and 11., Disp: 44 tablet, Rfl: 0    amoxicillin clavulanate 875-125 MG Oral Tab, Take 1 tablet by mouth every 12 (twelve) hours for 10  days., Disp: 20 tablet, Rfl: 0    DULoxetine 60 MG Oral Cap DR Particles, Take 1 capsule (60 mg total) by mouth daily., Disp: , Rfl:     Multiple Vitamin (MULTI-DAY) Oral Tab, Take by mouth every morning., Disp: , Rfl:     HYDROcodone-acetaminophen  MG Oral Tab, Take 1 tablet by mouth. (Patient not taking: Reported on 4/14/2025), Disp: , Rfl:     ibuprofen 800 MG Oral Tab, Take 1 tablet (800 mg total) by mouth every 6 (six) hours as needed for Pain. (Patient not taking: Reported on 4/14/2025), Disp: , Rfl:

## 2025-04-15 NOTE — PROGRESS NOTES
Lyudmila Epstein is a 44 year old female.    Chief Complaint   Patient presents with    Follow - Up     Patient is here due to hearing loss with audio          HISTORY OF PRESENT ILLNESS  She presents with history of developing a cold little over 2 weeks ago which subsequently went on to become a right ear infection and sinusitis.  Initially treated with 7 days of amoxicillin which did not really help completely resolve her sinusitis but did not do much at all for her ear.  Seen once again in immediate care and started on Augmentin for 10 days and she has a few days left of this medication.  Still notes no real change in her ear symptoms on the right but notes that her sinusitis is essentially resolved.  Using Sudafed twice daily and fluticasone twice daily.  Very concerned about hearing loss.  Feels that her hearing is down she has ringing and muffling of sound on that side to note dizziness no other significant signs, symptoms or complaints.      4/15/25 I saw her yesterday and recommended a hearing test based on her symptoms of hearing loss and physical exam at that time suggestive of possible recent perforation of the eardrum on the right.  Audiogram performed today does demonstrate mild low and mid frequency conductive hearing loss with normal pure-tone thresholds at both frequencies with a possible sensorineural component between 4000 and 8000 Hz only on the right side..  Will be starting her steroids today.  Using fluticasone and Sudafed twice daily.      Social Hx on file[1]    Family History[2]    Past Medical History[3]    Past Surgical History[4]      REVIEW OF SYSTEMS    System Neg/Pos Details   Constitutional Negative Fatigue, fever and weight loss.   ENMT Negative Drooling.   Eyes Negative Blurred vision and vision changes.   Respiratory Negative Dyspnea and wheezing.   Cardio Negative Chest pain, irregular heartbeat/palpitations and syncope.   GI Negative Abdominal pain and diarrhea.   Endocrine Negative  Cold intolerance and heat intolerance.   Neuro Negative Tremors.   Psych Negative Anxiety and depression.   Integumentary Negative Frequent skin infections, pigment change and rash.   Hema/Lymph Negative Easy bleeding and easy bruising.           PHYSICAL EXAM    [facility] 03/19/2025 (Approximate)        Constitutional Normal Overall appearance - Normal.   Psychiatric Normal Orientation - Oriented to time, place, person & situation. Appropriate mood and affect.   Neck Exam Normal Inspection - Normal. Palpation - Normal. Parotid gland - Normal. Thyroid gland - Normal.   Eyes Normal Conjunctiva - Right: Normal, Left: Normal. Pupil - Right: Normal, Left: Normal. Fundus - Right: Normal, Left: Normal.   Neurological Normal Memory - Normal. Cranial nerves - Cranial nerves II through XII grossly intact.   Head/Face Normal Facial features - Normal. Eyebrows - Normal. Skull - Normal.        Nasopharynx Normal External nose - Normal. Lips/teeth/gums - Normal. Tonsils - Normal. Oropharynx - Normal.   Ears Normal Inspection - Right: Normal, Left: Normal. Canal - Right: Normal, Left: Normal. TM - Right: Debris on surface of tympanic membrane left: Normal.   Skin Normal Inspection - Normal.        Lymph Detail Normal Submental. Submandibular. Anterior cervical. Posterior cervical. Supraclavicular.        Nose/Mouth/Throat Normal External nose - Normal. Lips/teeth/gums - Normal. Tonsils - Normal. Oropharynx - Normal.   Nose/Mouth/Throat Normal Nares - Right: Normal Left: Normal. Septum -Normal  Turbinates - Right: Normal, Left: Normal.     Medications - Current[5]  ASSESSMENT AND PLAN    1. Hearing loss, unspecified hearing loss type, unspecified laterality  - Audiology Referral - [facility])    2. Hearing loss of right ear, unspecified hearing loss type  Audiogram does demonstrate a low-frequency mild conductive hearing loss with at most a minimally moderate sensorineural loss at the highest frequencies between 4000  and 8000 Hz.  Start prednisone burst and taper continue with Sudafed and Flonase return to see me in 2 weeks with repeat audiogram.  She may have had sudden onset hearing loss from a viral etiology at the high-frequency is        This note was prepared using Dragon [employer] voice recognition dictation software. As a result errors may occur. When identified these errors have been corrected. While every attempt is made to correct errors during dictation discrepancies may still exist    Regis Russell MD    4/15/2025    1:18 PM         [1]   Social History  Socioeconomic History    Marital status:    Tobacco Use    Smoking status: Never    Smokeless tobacco: Never   Vaping Use    Vaping status: Never Used   Substance and Sexual Activity    Alcohol use: Yes     Comment: couple times per week    Drug use: Never    Sexual activity: Yes     Partners: Male     Birth control/protection: Condom   Other Topics Concern    Caffeine Concern No    Exercise Yes    Seat Belt No    Special Diet No    Stress Concern No    Weight Concern Yes    Blood Transfusions No   [2]   Family History  Problem Relation Age of Onset    Breast Cancer Mother 70    Thyroid disease Mother         Hyperthyroidism     Depression Mother     No Known Problems Father     No Known Problems Sister     No Known Problems Son     Heart Disorder Maternal Grandfather     Thyroid disease Maternal Grandfather     Cancer Paternal Grandmother 88        Leukemia   [3]   Past Medical History:   Anxiety    Depression    IBS (irritable bowel syndrome)    Pneumonia due to organism    Bacterial    PUPP (pruritic urticarial papules and plaques of pregnancy) ([facility])    Visual impairment    Reading glasses   [4]   Past Surgical History:  Procedure Laterality Date          2018    Cyst aspiration left      Done at [facility] in [place]    Endometrial ablation  2022    Hc  section level i      Cailin localization wire 1 site right (cpt=19281)   2023    Needle biopsy right Right 10/19/2023    US bx      2018, 2013    [place] teeth removed     [5]   Current Outpatient Medications:     HYDROcodone-acetaminophen  MG Oral Tab, Take 1 tablet by mouth. (Patient not taking: Reported on 2025), Disp: , Rfl:     predniSONE 10 MG Oral Tab, 6 tablets daily day one through 5, 4 tablets daily on days  6 and 7, 2 tablets daily on days 8 and 9, One tablet daily on days 10 and 11., Disp: 44 tablet, Rfl: 0    amoxicillin clavulanate 875-125 MG Oral Tab, Take 1 tablet by mouth every 12 (twelve) hours for 10 days., Disp: 20 tablet, Rfl: 0    DULoxetine 60 MG Oral Cap DR Particles, Take 1 capsule (60 mg total) by mouth daily., Disp: , Rfl:     ibuprofen 800 MG Oral Tab, Take 1 tablet (800 mg total) by mouth every 6 (six) hours as needed for Pain. (Patient not taking: Reported on 2025), Disp: , Rfl:     Multiple Vitamin (MULTI-DAY) Oral Tab, Take by mouth every morning., Disp: , Rfl:

## 2025-04-30 NOTE — PROGRESS NOTES
Breast Surgery Surveillance Visit    Diagnosis: Radial scar, right breast, s/p excisional biopsy on 2023    Stage: N/A    Disease Status:  Surgical treatment complete, no further treatment pending.    History of Present Illness:   Ms. Lyudmila Epstein is a 44 year old woman who presents with an imaging detected concern of the right breast.  The patient denies any palpable masses, nipple discharge, skin changes or axillary symptoms.  She has no personal prior history of breast disease or biopsies.  She does not have any known family history of breast cancer.  She presented for screening mammogram on 2023 and was found to have distortion in the right breast which additional imaging was recommended.  This took place on 2023 and confirmed a ill-defined hypoechoic area measuring up to 1.7 cm in this location and biopsy was recommended.  This took place on 2023 confirming portions of a radial scar with benign breast tissue. She underwent excisional biopsy, which occurred without complication. She has recovered well since the procedure and denies any new clinical concerns.  She had a bilateral diagnostic surveillance on 2024 that showed no suspicious concerns.  She is here today for evaluation and recommendations for further therapy.        Past Medical History:    Anxiety    Depression    IBS (irritable bowel syndrome)    Pneumonia due to organism    Bacterial    PUPP (pruritic urticarial papules and plaques of pregnancy) (HCC)    Visual impairment    Reading glasses       Past Surgical History:   Procedure Laterality Date          2018    Cyst aspiration left      Done at [facility] in [place]    Endometrial ablation  2022    Hc  section level i      [place] localization wire 1 site right (cpt=19281)  2023    Needle biopsy right Right 10/19/2023    US bx      2018, 2013    [place] teeth removed         Gynecological History:  Pt  is a   Pt was 32 years old at time of first pregnancy.    She has cumulative breastfeeding history of 20 months, last unknown.  She achieved menarche at age 15 and LMP 2023  She denies any history of hormone replacement therapy.  She has history of oral contraceptive use for 2-4 years, last 2 years ago.  She denies infertility treatment to achieve pregnancy.    Medications:    No outpatient medications have been marked as taking for the 25 encounter (Office Visit) with Ester Fisher MD.       Allergies:    No Known Allergies      Family History:   Family History   Problem Relation Age of Onset    Breast Cancer Mother 70    Thyroid disease Mother         Hyperthyroidism     Depression Mother     No Known Problems Father     No Known Problems Sister     No Known Problems Son     Heart Disorder Maternal Grandfather     Thyroid disease Maternal Grandfather     Cancer Paternal Grandmother 88        Leukemia       She is not of Ashkenazi [employer] ancestry.    Social History:  History   Alcohol Use    Yes     Comment: couple times per week       History   Smoking Status    Never   Smokeless Tobacco    Never     Ms. Lyudmila Epstein is  with 2 children. She has 1 siblings. She is currently Homemaker      Review of Systems:  General:   The patient denies, fever, chills, night sweats, fatigue, generalized weakness, change in appetite or weight loss.    HEENT:     The patient denies eye irritation, cataracts, redness, glaucoma, yellowing of the eyes, change in vision or color blindness. The patient denies hearing loss, ringing in the ears, ear drainage, earaches, nasal congestion, nose bleeds, snoring, pain in mouth/throat, hoarseness, change in voice, facial trauma. +glasses    Respiratory:  The patient denies chronic cough, phlegm, hemoptysis, pleurisy/chest pain, pneumonia, asthma, wheezing, difficulty in breathing with exertion, emphysema, chronic bronchitis, shortness of breath or abnormal sound when  breathing.     Cardiovascular:  There is no history of chest pain, chest pressure/discomfort, palpitations, irregular heartbeat, fainting or near-fainting, difficulty breathing when lying flat, SOB/Coughing at night, swelling of the legs or chest pain while walking.    Breasts:  See history of present illness    Gastrointestinal:     There is no history of difficulty or pain with swallowing, reflux symptoms, vomiting, dark or bloody stools, constipation, yellowing of the skin, indigestion, nausea, change in bowel habits, diarrhea, abdominal pain or vomiting blood.     Genitourinary:  The patient denies frequent urination, needing to get up at night to urinate, urinary hesitancy or retaining urine, painful urination, urinary incontinence, decreased urine stream, blood in the urine or vaginal/penile discharge.    Skin:    The patient denies rash, itching, skin lesions, +dry skin, change in skin color or change in moles.     Hematologic/Lymphatic:  The patient denies easily bruising or bleeding or persistent swollen glands or lymph nodes.     Musculoskeletal:  The patient denies muscle aches/pain, joint pain, stiff joints, neck pain, back pain or bone pain.    Neuropsychiatric:  There is no history of migraines or severe headaches, seizure/epilepsy, speech problems, coordination problems, trembling/tremors, fainting/black outs, dizziness, memory problems, loss of sensation/numbness, problems walking, weakness, tingling or burning in hands/feet. There is no history of abusive relationship, bipolar disorder, sleep disturbance, +anxiety, depression or feeling of despair.    Endocrine:    There is no history of poor/slow wound healing, weight loss/gain, fertility or hormone problems, cold intolerance, thyroid disease.     Allergic/Immunologic:  There is no history of hives, hay fever, angioedema or anaphylaxis.    BP (!) 158/94 (BP Location: Left arm, Patient Position: Sitting, Cuff Size: adult)   Pulse 102   Temp 97 °F  (36.1 °C) (Temporal)   Resp 16   Wt 72.6 kg (160 lb)   LMP 03/19/2025 (Approximate)   SpO2 95%   BMI 24.69 kg/m²     Physical Exam:  The patient is an alert, oriented, well-nourished and  well-developed woman who appears her stated age. Her speech patterns and movements are normal. Her affect is appropriate.    HEENT: The head is normocephalic. The neck is supple. The thyroid is not enlarged and is without palpable masses/nodules. There are no palpable masses. The trachea is in the midline. Conjunctiva are clear, non-icteric.    Chest: The chest expands symmetrically. The lungs are clear to auscultation.    Heart: The rhythm is regular.  There are no murmurs, rubs, gallops or thrills.    Breasts:  Her breasts are symmetrical with a cup size 34C.  Right breast: The skin, nipple ,and areola appear normal. There is no skin dimpling with movement of the pectoralis. There is no nipple retraction. No nipple discharge can be elicited. The parenchyma is mildly nodular. There are no dominant masses in the breast. The axillary tail is normal.  There is a well-healed incision with no signs of new or recurrent disease.  Left breast:   The skin, nipple, and areola appear normal. There is no skin dimpling with movement of the pectoralis. There is no nipple retraction. No nipple discharge can be elicited. The parenchyma is mildly nodular. There are no dominant masses in the breast. The axillary tail is normal.    Abdomen:  The abdomen is soft, flat and non tender. The liver is not enlarged. There are no palpable masses.    Lymph Nodes:  The supraclavicular, axillary and cervical regions are free of significant lymphadenopathy.    Back: There is no vertebral column tenderness.    Skin: The skin appears normal. There are no suspicious appearing rashes or lesions.    Extremities: The extremities are without deformity, cyanosis or edema.    Impression:   Ms. Lyudmila Epstein is a 44 year old woman presents with imaging detected right  breast radial scar, s/p excisional biopsy.    Recommendations:   I had a discussion with the Patient regarding her breast exam.  She is healing well since surgery with no signs of any new clinical concern.  I reviewed the recent imaging which is concordant with her clinical exam. I personally reviewed her pathology.  Showed a benign residual radial scar with no atypia.  We discussed no further treatment is needed for this and that this does not increase future breast cancer risk.  Will plan on bilateral screening mammogram in October 2025.  She was given ample opportunity for questions and those questions were answered to her satisfaction. She was encouraged to contact the office with any questions or concerns as needed related to her breast health.    This encounter lasted a total of 25 minutes, more than 50% of which was dedicated to the discussion of management options.

## 2025-08-05 ENCOUNTER — NURSE TRIAGE (OUTPATIENT)
Facility: CLINIC | Age: 45
End: 2025-08-05

## (undated) DEVICE — SUT SLK 2-0 18IN FS BK

## (undated) DEVICE — ENSEAL X1 TISSUE SEALER, CURVED JAW, 37 CM SHAFT LENGTH: Brand: ENSEAL

## (undated) DEVICE — MINOR GENERAL: Brand: MEDLINE INDUSTRIES, INC.

## (undated) DEVICE — PAD,ABDOMINAL,8"X7.5",STERILE,LF,1/PK: Brand: MEDLINE

## (undated) DEVICE — FLEXIBLE YANKAUER,MEDIUM TIP, NO VACUUM CONTROL: Brand: ARGYLE

## (undated) DEVICE — SUTURE VCRL SZ 3-0 L27IN ABSRB UD L26MM SH

## (undated) DEVICE — DRAPE SHEET LAVH 124X112X30

## (undated) DEVICE — GAMMEX® PI HYBRID SIZE 6.5, STERILE POWDER-FREE SURGICAL GLOVE, POLYISOPRENE AND NEOPRENE BLEND: Brand: GAMMEX

## (undated) DEVICE — BRA SURG ELIZ PINK L

## (undated) DEVICE — Device: Brand: JELCO

## (undated) DEVICE — ADHESIVE LIQ 2/3ML VI MASTISOL

## (undated) DEVICE — SOLUTION  .9 1000ML BTL

## (undated) DEVICE — DRAPE PACK CHEST

## (undated) DEVICE — DRAPE TAPE: Brand: CONVERTORS

## (undated) DEVICE — [HIGH FLOW INSUFFLATOR,  DO NOT USE IF PACKAGE IS DAMAGED,  KEEP DRY,  KEEP AWAY FROM SUNLIGHT,  PROTECT FROM HEAT AND RADIOACTIVE SOURCES.]: Brand: PNEUMOSURE

## (undated) DEVICE — TROCAR: Brand: KII® SLEEVE

## (undated) DEVICE — CONTAINER,SPECIMEN,OR STERILE,4OZ: Brand: MEDLINE

## (undated) DEVICE — APPLICATOR CHLORAPREP 26ML

## (undated) DEVICE — TUBING MEGADYNE LAPAROSCOPIC

## (undated) DEVICE — TROCAR: Brand: KII FIOS FIRST ENTRY

## (undated) DEVICE — SUTURE MCRYL SZ 4-0 L18IN ABSRB UD L19MM PS-2

## (undated) DEVICE — NON-ADHERENT PAD PREPACK: Brand: TELFA

## (undated) DEVICE — LAPAROSCOPY: Brand: MEDLINE INDUSTRIES, INC.

## (undated) DEVICE — CLIP SM INTNL HMCLP TNTLM ESCP

## (undated) DEVICE — INSUFFLATION NEEDLE TO ESTABLISH PNEUMOPERITONEUM.: Brand: INSUFFLATION NEEDLE

## (undated) DEVICE — SOLUTION ENDOSCOPIC ANTI-FOG NON-TOXIC NON-ABRASIVE 6 CUBIC CENTIMETER WITH RADIOPAQUE ADHESIVE-BACKED SPONGE STERILE NOT MADE WITH NATURAL RUBBER LATEX MEDICHOICE: Brand: MEDICHOICE

## (undated) DEVICE — 12 ML SYRINGE LUER-LOCK TIP: Brand: MONOJECT

## (undated) DEVICE — CLIP MED INTNL HMCLP TNTLM

## (undated) DEVICE — SOLUTION IRRIG 1000ML 0.9% NACL USP BTL

## (undated) NOTE — LETTER
7/18/2024          Lyudmila Epstein    [address] [facility] [address]         Dear Lyudmila,    Our records indicate that the tests ordered for you by SOPHY Banks  have not been done.  If you have, in fact, already completed the tests or you do not wish to have the tests done, please contact our office at THE NUMBER LISTED BELOW.  Otherwise, please proceed with the testing.   Labs order   Orders Placed on 3/21/2024  Helicobacter Pylori Breath Test, Adult -Please go to the lab to complete this test. Make sure you do not take a PPI (omeprazole/pantoprazole) or H2 blocker (famotidine) medication for 2 weeks prior to the test as this could result in a false negative result. Also do not eat one hour prior to the test.    H-pylori breath test     This test requires the adult patient (>17 years of age) to fast for 1 hour prior to test administration. The patient should not have taken antibiotics, proton pump inhibitors (e.g., Prilosec, Prevacid, Aciphex, Nexium), or bismuth preparations (e.g., Pepto-Bismol) within the previous 14 days.   The effect of histamine 2-receptor antogonists (e.g., Axid, Pepcid, Tagamet, Zantac) may reduce urease activity on urea breath tests and should be discontinued for 24-48 hours before the sample is collected.   When used to monitor treatment, the test should be performed four weeks after cessation of definitive therapy. The patient should be informed that the Pranactin-Citric drink that will be administered contains phenylalanine. Phenylketonurics restrict dietary phenylalanine.             To schedule a test at any [facility], call Central Scheduling at [phone], Monday through Friday between 7:30am to 6pm and on Saturday between 8am and 1pm.   Evening and weekend appointments for your exam are available.         Sincerely,    SOPHY Banks  [address]  [facility] [address]  [facility] [address]  [phone]

## (undated) NOTE — LETTER
[address]  [facility] [id number]  Authorization for Imaging Procedure  Date of Procedure:     I hereby authorize Dr. Apolinar Arceo, my physician and his/her assistants (if applicable), which may include medical students, residents, and/or fellows, to perform the following procedure and administer such anesthesia as may be determined necessary by my physician: [address] [facility] [address] [facility]. 2.  I recognize that during the procedure, unforeseen conditions may necessitate additional or different procedures than those listed above. I, therefore, further authorize and request that the above-named physician, assistants, or designees perform such procedures as are, in their judgment, necessary and desirable. 3.  My physician has discussed prior to my procedure the potential benefits, risks and side effects of this procedure; the likelihood of achieving goals; and potential problems that might occur during recuperation. They also discussed reasonable alternatives to the procedure, including risks, benefits, and side effects related to the alternatives and risks related to not receiving this procedure. I have had all my questions answered and I acknowledge that no guarantee has been made as to the result that may be obtained. 4.  Should the need arise during my procedure, which includes change of level of care prior to discharge, I also consent to the administration of blood and/or blood products. Further, I understand that despite careful testing and screening of blood or blood products by collecting agencies, I may still be subject to ill effects as a result of receiving a blood transfusion and/or blood products.  The following are some, but not all, of the potential risks that can occur: fever and allergic reactions, hemolytic reactions, transmission of diseases such as Hepatitis, AIDS and Cytomegalovirus (CMV) and fluid overload. In the event that I wish to have an autologous transfusion of my own blood, or a directed donor transfusion, I will discuss this with my physician. Check only if Refusing Blood or Blood Products  I understand refusal of blood or blood products as deemed necessary by my physician may have serious consequences to my condition to include possible death. I hereby assume responsibility for my refusal and release the hospital, its personnel, and my physicians from any responsibility for the consequences of my refusal.   [  ] Patient Refuses Blood      5. I authorize the use of any specimen, organs, tissues, body parts or foreign objects that may be removed from my body during the procedure for diagnosis, research or teaching purposes and their subsequent disposal by hospital authorities. I also authorize the release of specimen test results and/or written reports to my treating physician on the hospital medical staff or other referring or consulting physicians involved in my care, at the discretion of the Pathologist or my treating physician. 6.  I consent to the photographing or videotaping of the procedures to be performed, including appropriate portions of my body for medical, scientific, or educational purposes, provided my identity is not revealed by the pictures or by descriptive texts accompanying them. If the procedure has been photographed/videotaped, the physician will obtain the original picture, image, videotape or CD. The hospital will not be responsible for storage, release or maintenance of the picture, image, tape or CD.   7.  I consent to the presence of a  or observers in the operating room as deemed necessary by my physician or their designees. 8.  I recognize that in the event my procedure results in extended X-Ray/fluoroscopy time, I may develop a skin reaction. 9.   If I have a Do Not Attempt Resuscitation (DNAR) order in place, that status will be suspended while in the operating room, procedural suite, and during the recovery period unless otherwise explicitly stated by me (or a person authorized to consent on my behalf). The performing physician or my attending physician will determine when the applicable recovery period ends for purposes of reinstating the DNAR order. 10.  I acknowledge that my physician has explained sedation/analgesia administration to me including the risk and benefits I consent to the administration of sedation/analgesia as may be necessary or desirable in the judgment of my physician. I CERTIFY THAT I HAVE READ AND FULLY UNDERSTAND THE ABOVE CONSENT FOR THE PROCEDURE. Signature of Patient: _____________________________________________________________  Responsible person in case of minor, unconscious: ____________________________________  Relationship to patient:  __________________________________________________________  Signature of Witness: _______________________________Date: _________Time: __________    Statement of Physician: My signature below affirms that prior to the time of the procedure, I have explained to the patient and/or her guardian, the risks and benefits involved in the proposed treatment and any reasonable alternative to the proposed treatment. I have also explained the risks and benefits involved in the refusal of the proposed treatment and have answered the patient's questions. If I have a significant financial interest in a co-management agreement or a significant financial interest in any product or implant, or other significant relationship used in the procedure/surgery, I have disclosed this and had a discussion with my patient.   Signature of Physician:   _________________________________Date:_____________Time:________    Patient Name: Saira Mason : 10/9/1980  Printed: 2023   Medical Record #: [id number]

## (undated) NOTE — LETTER
[address] [facility] [address] [place], [place]  Authorization for Surgical Operation and Procedure                                                                                           I hereby authorize Hannah Fulton MD, my physician and his/her assistants (if applicable), which may include medical students, residents, and/or fellows, to perform the following surgical operation/ procedure and administer such anesthesia as may be determined necessary by my physician: Operation/Procedure name (s) Right breast excisional biopsy on [address]   2. I recognize that during the surgical operation/procedure, unforeseen conditions may necessitate additional or different procedures than those listed above. I, therefore, further authorize and request that the above-named surgeon, assistants, or designees perform such procedures as are, in their judgment, necessary and desirable. 3.   My surgeon/physician has discussed prior to my surgery the potential benefits, risks and side effects of this procedure; the likelihood of achieving goals; and potential problems that might occur during recuperation. They also discussed reasonable alternatives to the procedure, including risks, benefits, and side effects related to the alternatives and risks related to not receiving this procedure. I have had all my questions answered and I acknowledge that no guarantee has been made as to the result that may be obtained. 4.   Should the need arise during my operation/procedure, which includes change of level of care prior to discharge, I also consent to the administration of blood and/or blood products. Further, I understand that despite careful testing and screening of blood or blood products by collecting agencies, I may still be subject to ill effects as a result of receiving a blood transfusion and/or blood products.   The following are some, but not all, of the potential risks that can occur: fever and allergic reactions, hemolytic reactions, transmission of diseases such as Hepatitis, AIDS and Cytomegalovirus (CMV) and fluid overload. In the event that I wish to have an autologous transfusion of my own blood, or a directed donor transfusion, I will discuss this with my physician. Check only if Refusing Blood or Blood Products  I understand refusal of blood or blood products as deemed necessary by my physician may have serious consequences to my condition to include possible death. I hereby assume responsibility for my refusal and release the hospital, its personnel, and my physicians from any responsibility for the consequences of my refusal.    o  Refuse   5. I authorize the use of any specimen, organs, tissues, body parts or foreign objects that may be removed from my body during the operation/procedure for diagnosis, research or teaching purposes and their subsequent disposal by hospital authorities. I also authorize the release of specimen test results and/or written reports to my treating physician on the hospital medical staff or other referring or consulting physicians involved in my care, at the discretion of the Pathologist or my treating physician. 6.   I consent to the photographing or videotaping of the operations or procedures to be performed, including appropriate portions of my body for medical, scientific, or educational purposes, provided my identity is not revealed by the pictures or by descriptive texts accompanying them. If the procedure has been photographed/videotaped, the surgeon will obtain the original picture, image, videotape or CD. The hospital will not be responsible for storage, release or maintenance of the picture, image, tape or CD.    7.   I consent to the presence of a  or observers in the operating room as deemed necessary by my physician or their designees.     8.   I recognize that in the event my procedure results in extended X-Ray/fluoroscopy time, I may develop a skin reaction. 9. If I have a Do Not Attempt Resuscitation (DNAR) order in place, that status will be suspended while in the operating room, procedural suite, and during the recovery period unless otherwise explicitly stated by me (or a person authorized to consent on my behalf). The surgeon or my attending physician will determine when the applicable recovery period ends for purposes of reinstating the DNAR order. 10. Patients having a sterilization procedure: I understand that if the procedure is successful the results will be permanent and it will therefore be impossible for me to inseminate, conceive, or bear children. I also understand that the procedure is intended to result in sterility, although the result has not been guaranteed. 11. I acknowledge that my physician has explained sedation/analgesia administration to me including the risk and benefits I consent to the administration of sedation/analgesia as may be necessary or desirable in the judgment of my physician. I CERTIFY THAT I HAVE READ AND FULLY UNDERSTAND THE ABOVE CONSENT TO OPERATION and/or OTHER PROCEDURE.     _________________________________________ _________________________________     ___________________________________  Signature of Patient     Signature of Responsible Person                   Printed Name of Responsible Person                              _________________________________________ ______________________________        ___________________________________  Signature of Witness         Date  Time         Relationship to Patient    STATEMENT OF PHYSICIAN My signature below affirms that prior to the time of the procedure; I have explained to the patient and/or his/her legal representative, the risks and benefits involved in the proposed treatment and any reasonable alternative to the proposed treatment.  I have also explained the risks and benefits involved in refusal of the proposed treatment and alternatives to the proposed treatment and have answered the patient's questions.  If I have a significant financial interest in a co-management agreement or a significant financial interest in any product or implant, or other significant relationship used in this procedure/surgery, I have disclosed this and had a discussion with my patient.     _______________________________________________________________ _____________________________  Ruiz Mcdonough Physician)                                                                                         (Date)                                   (Time)  Patient Name: Sergio Wright    : 10/9/1980   Printed: 2023      Medical Record #: [id number]                                              Page 1 of 1

## (undated) NOTE — LETTER
[address]  Authorization for Surgical Operation and Procedure                                                                                           I hereby authorize                                     , my physician and his/her assistants (if applicable), which may include medical students, residents, and/or fellows, to perform the following surgical operation/ procedure and administer such anesthesia as may be determined necessary by my physician: Operation/Procedure name (s) Right breast wire localization on Yoselyn Rm   2. I recognize that during the surgical operation/procedure, unforeseen conditions may necessitate additional or different procedures than those listed above. I, therefore, further authorize and request that the above-named surgeon, assistants, or designees perform such procedures as are, in their judgment, necessary and desirable. 3.   My surgeon/physician has discussed prior to my surgery the potential benefits, risks and side effects of this procedure; the likelihood of achieving goals; and potential problems that might occur during recuperation. They also discussed reasonable alternatives to the procedure, including risks, benefits, and side effects related to the alternatives and risks related to not receiving this procedure. I have had all my questions answered and I acknowledge that no guarantee has been made as to the result that may be obtained. 4.   Should the need arise during my operation/procedure, which includes change of level of care prior to discharge, I also consent to the administration of blood and/or blood products. Further, I understand that despite careful testing and screening of blood or blood products by collecting agencies, I may still be subject to ill effects as a result of receiving a blood transfusion and/or blood products.   The following are some, but not all, of the potential risks that can occur: fever and allergic reactions, hemolytic reactions, transmission of diseases such as Hepatitis, AIDS and Cytomegalovirus (CMV) and fluid overload. In the event that I wish to have an autologous transfusion of my own blood, or a directed donor transfusion, I will discuss this with my physician. Check only if Refusing Blood or Blood Products  I understand refusal of blood or blood products as deemed necessary by my physician may have serious consequences to my condition to include possible death. I hereby assume responsibility for my refusal and release the hospital, its personnel, and my physicians from any responsibility for the consequences of my refusal.    o  Refuse   5. I authorize the use of any specimen, organs, tissues, body parts or foreign objects that may be removed from my body during the operation/procedure for diagnosis, research or teaching purposes and their subsequent disposal by hospital authorities. I also authorize the release of specimen test results and/or written reports to my treating physician on the hospital medical staff or other referring or consulting physicians involved in my care, at the discretion of the Pathologist or my treating physician. 6.   I consent to the photographing or videotaping of the operations or procedures to be performed, including appropriate portions of my body for medical, scientific, or educational purposes, provided my identity is not revealed by the pictures or by descriptive texts accompanying them. If the procedure has been photographed/videotaped, the surgeon will obtain the original picture, image, videotape or CD. The hospital will not be responsible for storage, release or maintenance of the picture, image, tape or CD.    7.   I consent to the presence of a  or observers in the operating room as deemed necessary by my physician or their designees.     8.   I recognize that in the event my procedure results in extended X-Ray/fluoroscopy time, I may develop a skin reaction. 9. If I have a Do Not Attempt Resuscitation (DNAR) order in place, that status will be suspended while in the operating room, procedural suite, and during the recovery period unless otherwise explicitly stated by me (or a person authorized to consent on my behalf). The surgeon or my attending physician will determine when the applicable recovery period ends for purposes of reinstating the DNAR order. 10. Patients having a sterilization procedure: I understand that if the procedure is successful the results will be permanent and it will therefore be impossible for me to inseminate, conceive, or bear children. I also understand that the procedure is intended to result in sterility, although the result has not been guaranteed. 11. I acknowledge that my physician has explained sedation/analgesia administration to me including the risk and benefits I consent to the administration of sedation/analgesia as may be necessary or desirable in the judgment of my physician. I CERTIFY THAT I HAVE READ AND FULLY UNDERSTAND THE ABOVE CONSENT TO OPERATION and/or OTHER PROCEDURE.     _________________________________________ _________________________________     ___________________________________  Signature of Patient     Signature of Responsible Person                   Printed Name of Responsible Person                              _________________________________________ ______________________________        ___________________________________  Signature of Witness         Date  Time         Relationship to Patient    STATEMENT OF PHYSICIAN My signature below affirms that prior to the time of the procedure; I have explained to the patient and/or his/her legal representative, the risks and benefits involved in the proposed treatment and any reasonable alternative to the proposed treatment.  I have also explained the risks and benefits involved in refusal of the proposed treatment and alternatives to the proposed treatment and have answered the patient's questions.  If I have a significant financial interest in a co-management agreement or a significant financial interest in any product or implant, or other significant relationship used in this procedure/surgery, I have disclosed this and had a discussion with my patient.     _______________________________________________________________ _____________________________  Marcela Quarles Physician)                                                                                         (Date)                                   (Time)  Patient Name: Hawa Lindsay    : 10/9/1980   Printed: 2023      Medical Record #: [id number]                                              Page 1 of 1

## (undated) NOTE — Clinical Note
Gabriela, I met with Audelia Staff in clinic today for weight loss/management. I have recommended intensive lifestyle/behavioral modifications for weight loss. In addition, I have recommended she consider topiramate as discussed with Dr. Selina Charlton in the past to help facilitate her weight loss with a focus on lowering her elevated CRP level through lifestyle changes. I explained lexapro use in the past likely contributed to her weight gain. She may meet with the dietician. She will RTC 2-3 months for ongoing support as needed. Please let me know if you have any questions or concerns. Thank you very much for referring your patient to our clinic.    Take good care, SOPHY Gillis